# Patient Record
Sex: FEMALE | Race: WHITE | Employment: OTHER | URBAN - NONMETROPOLITAN AREA
[De-identification: names, ages, dates, MRNs, and addresses within clinical notes are randomized per-mention and may not be internally consistent; named-entity substitution may affect disease eponyms.]

---

## 2017-07-31 ENCOUNTER — OFFICE VISIT (OUTPATIENT)
Dept: NEUROLOGY | Age: 67
End: 2017-07-31
Payer: MEDICARE

## 2017-07-31 VITALS
OXYGEN SATURATION: 96 % | BODY MASS INDEX: 50.97 KG/M2 | SYSTOLIC BLOOD PRESSURE: 141 MMHG | DIASTOLIC BLOOD PRESSURE: 56 MMHG | HEIGHT: 62 IN | WEIGHT: 277 LBS | HEART RATE: 91 BPM

## 2017-07-31 DIAGNOSIS — Z99.89 CPAP (CONTINUOUS POSITIVE AIRWAY PRESSURE) DEPENDENCE: ICD-10-CM

## 2017-07-31 DIAGNOSIS — G25.81 RESTLESS LEG SYNDROME: ICD-10-CM

## 2017-07-31 DIAGNOSIS — G47.33 OBSTRUCTIVE SLEEP APNEA: Primary | ICD-10-CM

## 2017-07-31 PROCEDURE — 99213 OFFICE O/P EST LOW 20 MIN: CPT | Performed by: PHYSICIAN ASSISTANT

## 2017-07-31 RX ORDER — FUROSEMIDE 40 MG/1
40 TABLET ORAL 2 TIMES DAILY
Refills: 3 | COMMUNITY
Start: 2017-06-13

## 2017-07-31 RX ORDER — PRAVASTATIN SODIUM 20 MG
TABLET ORAL
Refills: 3 | COMMUNITY
Start: 2017-06-15 | End: 2018-07-31

## 2017-07-31 RX ORDER — CLOTRIMAZOLE AND BETAMETHASONE DIPROPIONATE 10; .64 MG/G; MG/G
CREAM TOPICAL
Refills: 2 | COMMUNITY
Start: 2017-07-02 | End: 2018-10-01

## 2017-12-07 ENCOUNTER — OFFICE VISIT (OUTPATIENT)
Dept: OBSTETRICS AND GYNECOLOGY | Facility: CLINIC | Age: 67
End: 2017-12-07

## 2017-12-07 VITALS
DIASTOLIC BLOOD PRESSURE: 90 MMHG | BODY MASS INDEX: 50.61 KG/M2 | SYSTOLIC BLOOD PRESSURE: 178 MMHG | WEIGHT: 275 LBS | HEIGHT: 62 IN

## 2017-12-07 DIAGNOSIS — N89.8 VAGINAL DISCHARGE: ICD-10-CM

## 2017-12-07 DIAGNOSIS — Z01.419 ENCOUNTER FOR GYNECOLOGICAL EXAMINATION WITHOUT ABNORMAL FINDING: Primary | ICD-10-CM

## 2017-12-07 DIAGNOSIS — R21 RASH: ICD-10-CM

## 2017-12-07 PROCEDURE — 87481 CANDIDA DNA AMP PROBE: CPT | Performed by: NURSE PRACTITIONER

## 2017-12-07 PROCEDURE — 87798 DETECT AGENT NOS DNA AMP: CPT | Performed by: NURSE PRACTITIONER

## 2017-12-07 PROCEDURE — G0101 CA SCREEN;PELVIC/BREAST EXAM: HCPCS | Performed by: NURSE PRACTITIONER

## 2017-12-07 PROCEDURE — 87661 TRICHOMONAS VAGINALIS AMPLIF: CPT | Performed by: NURSE PRACTITIONER

## 2017-12-07 PROCEDURE — 99212 OFFICE O/P EST SF 10 MIN: CPT | Performed by: NURSE PRACTITIONER

## 2017-12-07 PROCEDURE — 87512 GARDNER VAG DNA QUANT: CPT | Performed by: NURSE PRACTITIONER

## 2017-12-07 RX ORDER — VITAMIN E 268 MG
CAPSULE ORAL
COMMUNITY

## 2017-12-07 RX ORDER — PROPRANOLOL HYDROCHLORIDE 20 MG/1
20 TABLET ORAL
Refills: 3 | COMMUNITY
Start: 2017-11-12

## 2017-12-07 RX ORDER — POTASSIUM CHLORIDE 750 MG/1
CAPSULE, EXTENDED RELEASE ORAL
Refills: 3 | COMMUNITY
Start: 2017-09-16

## 2017-12-07 RX ORDER — AMOXICILLIN 250 MG
CAPSULE ORAL
COMMUNITY
End: 2019-12-09

## 2017-12-07 RX ORDER — DULOXETIN HYDROCHLORIDE 60 MG/1
CAPSULE, DELAYED RELEASE ORAL
Refills: 3 | COMMUNITY
Start: 2017-09-19

## 2017-12-07 RX ORDER — CLOTRIMAZOLE AND BETAMETHASONE DIPROPIONATE 10; .64 MG/G; MG/G
CREAM TOPICAL
COMMUNITY
Start: 2017-07-02 | End: 2023-04-06 | Stop reason: ALTCHOICE

## 2017-12-07 RX ORDER — TRANDOLAPRIL AND VERAPAMIL HYDROCHLORIDE 4; 240 MG/1; MG/1
TABLET, FILM COATED, EXTENDED RELEASE ORAL
COMMUNITY

## 2017-12-07 RX ORDER — OMEPRAZOLE 20 MG/1
CAPSULE, DELAYED RELEASE ORAL
Refills: 2 | COMMUNITY
Start: 2017-09-08 | End: 2023-04-06 | Stop reason: ALTCHOICE

## 2017-12-07 RX ORDER — ALLOPURINOL 100 MG/1
TABLET ORAL
Refills: 5 | COMMUNITY
Start: 2017-11-12 | End: 2018-08-02 | Stop reason: DRUGHIGH

## 2017-12-07 RX ORDER — DULOXETIN HYDROCHLORIDE 60 MG/1
CAPSULE, DELAYED RELEASE ORAL
COMMUNITY
Start: 2016-07-02 | End: 2018-07-24 | Stop reason: SDUPTHER

## 2017-12-07 RX ORDER — FUROSEMIDE 40 MG/1
40 TABLET ORAL
Refills: 3 | COMMUNITY
Start: 2017-09-08

## 2017-12-07 RX ORDER — NYSTATIN AND TRIAMCINOLONE ACETONIDE 100000; 1 [USP'U]/G; MG/G
OINTMENT TOPICAL 2 TIMES DAILY
Qty: 30 G | Refills: 2 | Status: SHIPPED | OUTPATIENT
Start: 2017-12-07 | End: 2018-07-24

## 2017-12-07 RX ORDER — CYANOCOBALAMIN 1000 UG/ML
INJECTION, SOLUTION INTRAMUSCULAR; SUBCUTANEOUS
Refills: 2 | COMMUNITY
Start: 2017-11-14 | End: 2023-04-06

## 2017-12-07 RX ORDER — MELOXICAM 7.5 MG/1
TABLET ORAL
COMMUNITY
Start: 2016-05-24 | End: 2018-07-24

## 2017-12-07 RX ORDER — MELOXICAM 7.5 MG/1
TABLET ORAL
Refills: 3 | COMMUNITY
Start: 2017-10-20 | End: 2018-07-24

## 2017-12-07 RX ORDER — LEVOTHYROXINE SODIUM 0.12 MG/1
TABLET ORAL
Refills: 2 | COMMUNITY
Start: 2017-12-01

## 2017-12-07 RX ORDER — EZETIMIBE 10 MG/1
TABLET ORAL
Refills: 3 | COMMUNITY
Start: 2017-10-13

## 2017-12-11 LAB
GEN CATEG CVX/VAG CYTO-IMP: NORMAL
LAB AP CASE REPORT: NORMAL
Lab: NORMAL
PATH INTERP SPEC-IMP: NORMAL
STAT OF ADQ CVX/VAG CYTO-IMP: NORMAL

## 2018-03-22 ENCOUNTER — TRANSCRIBE ORDERS (OUTPATIENT)
Dept: ADMINISTRATIVE | Facility: HOSPITAL | Age: 68
End: 2018-03-22

## 2018-03-22 DIAGNOSIS — D64.9 ANEMIA, UNSPECIFIED TYPE: Primary | ICD-10-CM

## 2018-03-27 ENCOUNTER — APPOINTMENT (OUTPATIENT)
Dept: GENERAL RADIOLOGY | Facility: HOSPITAL | Age: 68
End: 2018-03-27

## 2018-04-04 ENCOUNTER — APPOINTMENT (OUTPATIENT)
Dept: GENERAL RADIOLOGY | Facility: HOSPITAL | Age: 68
End: 2018-04-04

## 2018-07-24 ENCOUNTER — OFFICE VISIT (OUTPATIENT)
Dept: OBSTETRICS AND GYNECOLOGY | Facility: CLINIC | Age: 68
End: 2018-07-24

## 2018-07-24 VITALS
BODY MASS INDEX: 51.16 KG/M2 | SYSTOLIC BLOOD PRESSURE: 170 MMHG | DIASTOLIC BLOOD PRESSURE: 80 MMHG | WEIGHT: 278 LBS | HEIGHT: 62 IN

## 2018-07-24 DIAGNOSIS — R21 RASH: Primary | ICD-10-CM

## 2018-07-24 DIAGNOSIS — Z78.9 NON-SMOKER: ICD-10-CM

## 2018-07-24 PROCEDURE — 99213 OFFICE O/P EST LOW 20 MIN: CPT | Performed by: NURSE PRACTITIONER

## 2018-07-24 RX ORDER — FLUCONAZOLE 100 MG/1
100 TABLET ORAL DAILY
Qty: 5 TABLET | Refills: 0 | Status: SHIPPED | OUTPATIENT
Start: 2018-07-24 | End: 2018-07-29

## 2018-07-24 RX ORDER — HYDRALAZINE HYDROCHLORIDE 25 MG/1
25 TABLET, FILM COATED ORAL 2 TIMES DAILY
Refills: 5 | COMMUNITY
Start: 2018-06-26

## 2018-07-30 LAB
BACTERIA SPEC AEROBE CULT: NORMAL
BACTERIA SPEC ANAEROBE CULT: NORMAL
BACTERIA SPEC CULT: NORMAL
BACTERIA SPEC CULT: NORMAL
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
## 2018-07-31 ENCOUNTER — OFFICE VISIT (OUTPATIENT)
Dept: NEUROLOGY | Age: 68
End: 2018-07-31
Payer: MEDICARE

## 2018-07-31 VITALS
WEIGHT: 277 LBS | HEART RATE: 72 BPM | OXYGEN SATURATION: 96 % | DIASTOLIC BLOOD PRESSURE: 64 MMHG | BODY MASS INDEX: 50.97 KG/M2 | SYSTOLIC BLOOD PRESSURE: 137 MMHG | HEIGHT: 62 IN

## 2018-07-31 DIAGNOSIS — G25.81 RESTLESS LEG SYNDROME: ICD-10-CM

## 2018-07-31 DIAGNOSIS — Z99.89 CPAP (CONTINUOUS POSITIVE AIRWAY PRESSURE) DEPENDENCE: ICD-10-CM

## 2018-07-31 DIAGNOSIS — G47.33 OBSTRUCTIVE SLEEP APNEA: Primary | ICD-10-CM

## 2018-07-31 PROCEDURE — 4040F PNEUMOC VAC/ADMIN/RCVD: CPT | Performed by: PHYSICIAN ASSISTANT

## 2018-07-31 PROCEDURE — 1101F PT FALLS ASSESS-DOCD LE1/YR: CPT | Performed by: PHYSICIAN ASSISTANT

## 2018-07-31 PROCEDURE — 1123F ACP DISCUSS/DSCN MKR DOCD: CPT | Performed by: PHYSICIAN ASSISTANT

## 2018-07-31 PROCEDURE — G8417 CALC BMI ABV UP PARAM F/U: HCPCS | Performed by: PHYSICIAN ASSISTANT

## 2018-07-31 PROCEDURE — 1036F TOBACCO NON-USER: CPT | Performed by: PHYSICIAN ASSISTANT

## 2018-07-31 PROCEDURE — 99213 OFFICE O/P EST LOW 20 MIN: CPT | Performed by: PHYSICIAN ASSISTANT

## 2018-07-31 PROCEDURE — G8400 PT W/DXA NO RESULTS DOC: HCPCS | Performed by: PHYSICIAN ASSISTANT

## 2018-07-31 PROCEDURE — G8427 DOCREV CUR MEDS BY ELIG CLIN: HCPCS | Performed by: PHYSICIAN ASSISTANT

## 2018-07-31 PROCEDURE — 1090F PRES/ABSN URINE INCON ASSESS: CPT | Performed by: PHYSICIAN ASSISTANT

## 2018-07-31 PROCEDURE — 3017F COLORECTAL CA SCREEN DOC REV: CPT | Performed by: PHYSICIAN ASSISTANT

## 2018-07-31 RX ORDER — DOCUSATE SODIUM 100 MG/1
100 CAPSULE, LIQUID FILLED ORAL 4 TIMES DAILY
COMMUNITY

## 2018-07-31 RX ORDER — HYDRALAZINE HYDROCHLORIDE 25 MG/1
25 TABLET, FILM COATED ORAL 2 TIMES DAILY
COMMUNITY
Start: 2018-06-26

## 2018-07-31 RX ORDER — RANITIDINE 150 MG/1
150 TABLET ORAL 2 TIMES DAILY
COMMUNITY
End: 2020-11-19

## 2018-07-31 RX ORDER — ALLOPURINOL 100 MG/1
300 TABLET ORAL DAILY
COMMUNITY
Start: 2017-11-12

## 2018-07-31 NOTE — PROGRESS NOTES
REVIEW OF SYSTEMS    Constitutional: []Fever []Sweats []Chills [] Recent Injury   [x] Denies all unless marked  HENT:[]Headache  [] Head Injury  [] Sore Throat  [] Ear Pain  [] Dizziness [] Hearing Loss   [x] Denies all unless marked  Spine:  [] Neck pain  [] Back pain  [] Sciaticia  [x] Denies all unless marked  Cardiovascular:[]Chest Pain []Palpitations [] Heart Disease  [x] Denies all unless marked  Pulmonary: []Shortness of Breath []Cough   [x] Denies all unless marked  Gastrointestinal:  []Abdominal Pain  []Blood in Stool  []Diarrhea [x]Constipation []Nausea  []Vomiting  [] Denies all unless marked  Genitourinary:  [] Dysuria [] Frequency  [] Incontinence [] Urgency   [x] Denies all unless marked  Musculoskeletal: [x] Arthralgia  [] Myalgias [] Muscle cramps  [] Muscle twitches   [] Denies all unless marked   Extremities:   [] Pain   [] Swelling   [x] Denies all unless marked  Skin:[] Rash  [] Color Change  [x] Denies all unless marked  Neurological:[] Visual Disturbance [] Double Vision [] Slurred Speech [] Trouble swallowing  [] Vertigo [] Tingling [] Numbness [] Weakness [] Loss of Balance   [] Loss of Consciousness [] Memory Loss [] Seizures  [x] Denies all unless marked  Psychiatric/Behavioral:[x] Depression [x] Anxiety  [] Denies all unless marked  Sleep: []  Insomnia [] Sleep Disturbance [] Snoring [] Restless Legs [] Daytime Sleepiness [x] [employer]  [] Denies all unless marked

## 2018-07-31 NOTE — PATIENT INSTRUCTIONS
levels to fall and carbon dioxide to rise. Because the airway is blocked, breathing faster or harder does not help to improve oxygen levels until the airway is reopened. Typically, the obstruction requires the person to awaken to activate the upper airway muscles. Once the airway is opened, the person then takes several deep breaths to catch up on breathing. As the person awakens, he or she may move briefly, snort or snore, and take a deep breath. Less frequently, a person may awaken completely with a sensation of gasping, smothering, or choking. If the person falls back to sleep quickly, he or she will not remember the event. Many people with sleep apnea are unaware of their abnormal breathing in sleep, and all patients underestimate how often their sleep is interrupted. Awakening from sleep causes sleep to be unrefreshing and causes fatigue and daytime sleepiness. Anatomic causes of obstructive sleep apnea   Most patients have LENA because of a small upper airway. As the bones of the face and skull develop, some people develop a small lower face, a small mouth, and a tongue that seems too large for the mouth. These features are genetically determined, which explains why LENA tends to cluster in families. Obesity is another major factor. Tonsil enlargement can be an important cause, especially in children. SLEEP APNEA SYMPTOMS  The main symptoms of LENA are loud snoring, fatigue, and daytime sleepiness. However, some people have no symptoms. For example, if the person does not have a bed partner, he or she may not be aware of the snoring. Fatigue and sleepiness have many causes and are often attributed to overwork and increasing age. As a result, a person may be slow to recognize that they have a problem. A bed partner or spouse often prompts the patient to seek medical care. Other symptoms may include one or more of the following:  ?Restless sleep  ? Awakening with choking, gasping, or smothering  ? Morning headaches, dry mouth, or sore throat  ? Waking frequently to urinate  ? Awakening unrested, groggy  ? Low energy, difficulty concentrating, memory impairment    Risk factors  Certain factors increase the risk of sleep apnea. ?Increasing age [de-identified] LENA occurs at all ages, but it is more common in middle and older age adults. ?Male sex  LENA is two times more common in men, especially in middle age. ?Obesity  The more obese a person is, the more likely he or she is to have LENA. ? Sedation from medication or alcohol  This interferes with the ability to awaken from sleep and can lengthen periods of apnea (no breathing), with potentially dangerous consequences. ? Abnormality of the airway. SLEEP APNEA CONSEQUENCES  Complications of sleep apnea can include daytime sleepiness and difficulty concentrating. The consequence of this is an increased risk of accidents and errors in daily activities. Studies have shown that people with severe LENA are more than twice as likely to be involved in a motor vehicle accident as people without these conditions. People with LENA are encouraged to discuss options for driving, working, and performing other high-risk tasks with a healthcare provider. In addition, people with untreated LENA may have an increased risk of cardiovascular problems such as high blood pressure, heart attack, abnormal heart rhythms, or stroke. This risk may be due to changes in the heart rate and blood pressure that occur during sleep. SLEEP APNEA DIAGNOSIS  The diagnosis of LENA is best made by a knowledgeable sleep medicine specialist who has an understanding of the individual's health issues. The diagnosis is usually based upon the person's medical history, physical examination, and testing, including:  ? A complaint of snoring and ineffective sleep  ? Neck size (greater than 16 inches in men or 14 inches in women) is associated with an increased risk of sleep apnea  ? A small upper airway: difficulty seeing the have a higher success rate, particularly in people with abnormal jaw (maxilla and mandible) anatomy, but it is the most complicated procedure. A newer surgical approach, nerve stimulation to protrude the tongue, has promising success rates in very selected people. Tracheostomy creates a permanent opening in the neck. It is reserved for people with severe disease in whom less drastic measures have failed or are inappropriate. Although it is always successful in eliminating obstructive sleep apnea, tracheostomy requires significant lifestyle changes and carries some serious risks (eg, infection, bleeding, blockage). All surgical treatments require discussions about the goals of treatment, the expected outcomes, and potential complications. Hypoglossal nerve stimulator- \"Inspire\" device    WHERE TO GET MORE INFORMATION  Your healthcare provider is the best source of information for questions and concerns related to your medical problem. Organizations  American [employer] Association  Provides information about sleep apnea to the public, publishes a newsletter, and serves as an advocate for people with the disorder. Renetta, [address]   Don@[employer]. org   [email or web address]. org   Tel: [phone]   Fax: [facility] organization that works to [employer] and safety by promoting public understanding of sleep and sleep disorders. Supports sleep-related education, research, and advocacy; produces and distributes educational materials to the public and healthcare professionals; and offers postdoctoral fellowships and grants for sleep researchers. Katelyn [email or web address]   Beth.[email or web address] Rudy Reis   Tel: [phone]   Fax: [phone]    Important information:  Medicare/private insurance CPAP/BiPAP/APAP requirements:  Medicare/private insurance has specific requirements for PAP compliance that must be met during the first 90 days of use to continue coverage for CPAP/BiPAP/APAP  from day 91 and beyond. The policy requires that patients use a PAP device 4 hours per 24 hour period, at least 70% of the time over a 30 day period. This data must be downloaded as a report direct from the PAP devices. This is called a compliance download. Your PAP supplier will assist you in this matter. Reminder:  Please bring a copy of the compliance download to your next office visit or have your supplier fax it to our office prior to your office visit. Note:  Where applicable, we will utilize PAP device efficiency reports, additional testing, and face-to-face  clinical evaluation subsequent to any treatment, changes in treatment, and continued treatment. Caution:  Please abstain from driving or engaging in other activities which may be hazardous in the presence of diminished alertness or daytime drowsiness. And avoid the use of sedatives or alcohol, which can worsen sleep apnea and daytime drowsiness. Mask suggestions:  - Resmed Airfit N20 (Nasal) or F20 (Full face mask). They conform to your face, thus decreasing the potential for mask leakage. You might like the [employer] (full face mask). It has a \"memory foam\" like cushion. You might also like to try a nasal mask called a Dreamwear nasal mask or the Dreamwear nasal pillow. One other suggestion is the [facility], it is a minimal contact full face mask. The Merrily Sabas incredible under the nose design makes it the only full face mask that won't cause red marks on the bridge of your nose when compared to other full face masks. The newest mask available is a Dreamwear full face mask. It has a soft feel, unique in-frame air-flow, and innovative air tube connection at the top of the head for the ultimate in sleep comfort.         Patient Education        Restless Legs Syndrome: Care Instructions  Your increase your chances of quitting for good. · Do not drink alcohol late in the evening. Take steps to help you sleep better  · Get plenty of sunlight in the outdoors, particularly later in the afternoon. · Use the evening hours for settling down. Avoid activities that challenge you in the hours before bedtime. · Eat meals at regular times, and do not snack before bedtime. · Keep your bedroom quiet, dark, and cool. Try using a sleep mask and earplugs to help you sleep. · Limit how much you drink at night to reduce your need to get up to urinate. But do not go to bed thirsty. · Run a fan or other steady \"white noise\" during the night if noises wake you up. · [place] the bed for sleeping and sex. Do your reading or TV watching in another room. · Once you are in bed, relax from head to toe, and guide your mind to pleasant thoughts. · Do not stay in bed longer than 8 hours, and try to avoid naps. · If your symptoms usually improve around 4 a.m. to 6 a.m., try going to bed later than usual or allowing extra time for sleeping in to help you get the rest you need. When should you call for help? Watch closely for changes in your health, and be sure to contact your doctor if:    · You are still not getting enough sleep.     · Your symptoms become more severe or happen more often. Where can you learn more? Go to [email or web address] org and sign in to your [employer] account. Enter B191 in the [facility] box to learn more about \"Restless Legs Syndrome: Care Instructions. \"     If you do not have an account, please click on the \"Sign Up Now\" link. Current as of: October 9, 2017  Content Version: 11.6  © 2313-7835 [employer]. Care instructions adapted under license by [employer].  If you have questions about a medical condition or this instruction, always ask your healthcare professional. [address]  any warranty or liability for your use of this information.

## 2018-08-02 ENCOUNTER — OFFICE VISIT (OUTPATIENT)
Dept: OBSTETRICS AND GYNECOLOGY | Facility: CLINIC | Age: 68
End: 2018-08-02

## 2018-08-02 VITALS
WEIGHT: 275 LBS | SYSTOLIC BLOOD PRESSURE: 160 MMHG | BODY MASS INDEX: 50.61 KG/M2 | DIASTOLIC BLOOD PRESSURE: 80 MMHG | HEIGHT: 62 IN

## 2018-08-02 DIAGNOSIS — Z78.9 NON-SMOKER: ICD-10-CM

## 2018-08-02 DIAGNOSIS — R21 RASH: Primary | ICD-10-CM

## 2018-08-02 PROCEDURE — 99213 OFFICE O/P EST LOW 20 MIN: CPT | Performed by: NURSE PRACTITIONER

## 2018-08-02 RX ORDER — VALACYCLOVIR HYDROCHLORIDE 1 G/1
1000 TABLET, FILM COATED ORAL 3 TIMES DAILY
Qty: 21 TABLET | Refills: 0 | Status: SHIPPED | OUTPATIENT
Start: 2018-08-02 | End: 2019-12-09

## 2018-08-02 RX ORDER — RANITIDINE 150 MG/1
150 TABLET ORAL
COMMUNITY
End: 2023-04-06 | Stop reason: ALTCHOICE

## 2018-08-02 RX ORDER — ALLOPURINOL 300 MG/1
300 TABLET ORAL DAILY
Refills: 5 | COMMUNITY
Start: 2018-07-28

## 2018-08-02 RX ORDER — FLUCONAZOLE 100 MG/1
100 TABLET ORAL DAILY
Qty: 10 TABLET | Refills: 0 | Status: SHIPPED | OUTPATIENT
Start: 2018-08-02 | End: 2019-12-09

## 2018-08-02 RX ORDER — METHYLPREDNISOLONE 4 MG/1
TABLET ORAL
COMMUNITY
Start: 2018-07-31 | End: 2019-12-09

## 2018-10-01 ENCOUNTER — OFFICE VISIT (OUTPATIENT)
Dept: NEUROLOGY | Age: 68
End: 2018-10-01
Payer: MEDICARE

## 2018-10-01 VITALS
SYSTOLIC BLOOD PRESSURE: 117 MMHG | DIASTOLIC BLOOD PRESSURE: 45 MMHG | BODY MASS INDEX: 51.53 KG/M2 | HEIGHT: 62 IN | WEIGHT: 280 LBS | OXYGEN SATURATION: 95 % | HEART RATE: 64 BPM

## 2018-10-01 DIAGNOSIS — Z99.89 CPAP (CONTINUOUS POSITIVE AIRWAY PRESSURE) DEPENDENCE: ICD-10-CM

## 2018-10-01 DIAGNOSIS — G47.33 OBSTRUCTIVE SLEEP APNEA: Primary | ICD-10-CM

## 2018-10-01 DIAGNOSIS — G25.81 RESTLESS LEG SYNDROME: ICD-10-CM

## 2018-10-01 PROCEDURE — 1123F ACP DISCUSS/DSCN MKR DOCD: CPT | Performed by: PHYSICIAN ASSISTANT

## 2018-10-01 PROCEDURE — G8427 DOCREV CUR MEDS BY ELIG CLIN: HCPCS | Performed by: PHYSICIAN ASSISTANT

## 2018-10-01 PROCEDURE — 1101F PT FALLS ASSESS-DOCD LE1/YR: CPT | Performed by: PHYSICIAN ASSISTANT

## 2018-10-01 PROCEDURE — 1090F PRES/ABSN URINE INCON ASSESS: CPT | Performed by: PHYSICIAN ASSISTANT

## 2018-10-01 PROCEDURE — 99213 OFFICE O/P EST LOW 20 MIN: CPT | Performed by: PHYSICIAN ASSISTANT

## 2018-10-01 PROCEDURE — G8484 FLU IMMUNIZE NO ADMIN: HCPCS | Performed by: PHYSICIAN ASSISTANT

## 2018-10-01 PROCEDURE — G8400 PT W/DXA NO RESULTS DOC: HCPCS | Performed by: PHYSICIAN ASSISTANT

## 2018-10-01 PROCEDURE — 3017F COLORECTAL CA SCREEN DOC REV: CPT | Performed by: PHYSICIAN ASSISTANT

## 2018-10-01 PROCEDURE — 1036F TOBACCO NON-USER: CPT | Performed by: PHYSICIAN ASSISTANT

## 2018-10-01 PROCEDURE — 4040F PNEUMOC VAC/ADMIN/RCVD: CPT | Performed by: PHYSICIAN ASSISTANT

## 2018-10-01 PROCEDURE — G8417 CALC BMI ABV UP PARAM F/U: HCPCS | Performed by: PHYSICIAN ASSISTANT

## 2018-10-01 NOTE — PROGRESS NOTES
marked  Cardiovascular:[]Chest Pain []Palpitations [] Heart Disease  [x] Denies all unless marked  Pulmonary: []Shortness of Breath []Cough   [x] Denies all unless marked  Gastrointestinal:  []Abdominal Pain  []Blood in Stool  []Diarrhea []Constipation []Nausea  []Vomiting  [x] Denies all unless marked  Genitourinary:  [] Dysuria [] Frequency  [] Incontinence [] Urgency   [x] Denies all unless marked  Musculoskeletal: [] Arthralgia  [] Myalgias [] Muscle cramps  [] Muscle twitches   [x] Denies all unless marked   Extremities:   [] Pain   [] Swelling   [x] Denies all unless marked  Skin:[] Rash  [] Color Change  [x] Denies all unless marked  Neurological:[] Visual Disturbance [] Double Vision [] Slurred Speech [] Trouble swallowing  [] Vertigo [] Tingling [] Numbness [] Weakness [] Loss of Balance   [] Loss of Consciousness [] Memory Loss [] Seizures  [x] Denies all unless marked  Psychiatric/Behavioral:[] Depression [] Anxiety  [x] Denies all unless marked  Sleep: []  Insomnia [] Sleep Disturbance [] Snoring [] Restless Legs [] Daytime Sleepiness [x] [employer]  [x] Denies all unless marked    The MA has completed the ROS with the patient. I have reviewed it in its' entirety with the patient and agree with the documentation. PHYSICAL EXAM  BP (!) 117/45   Pulse 64   Ht 5' 2\" (1.575 m)   Wt 280 lb (127 kg)   SpO2 95%   BMI 51.21 kg/m²      Constitutional  No acute distress    HEENT- Conjunctiva normal.  No scars, masses, or lesions over external nose or ears, no neck masses noted, no jugular vein distension, no bruit  Cardiac- Regular rate and rhythm; Grade I/VI murmur  Pulmonary- Clear to auscultation, good expansion, normal effort without use of accessory muscles  Musculoskeletal  No significant wasting of muscles noted, no bony deformities  Extremities - No clubbing, cyanosis or edema  Skin  Warm, dry, and intact.   No rash, erythema, or pallor  Psychiatric  Mood, affect, and behavior appear normal suppressants. 2.  The following educational material has been included in this visit after visit summary for your review: LENA/PAP guidelines/RLS-Discussed with the patient and all questions fully answered. 3.  The current medical regimen is effective;  continue present plan. 4.  Continue CPAP  5. Monitor B/P and follow up with PMD if it persists to be hypotensive  6. Follow up in 1 year      Note:  A total of >50% (>8 minutes) of 15 minutes was spent discussing the pathophysiology and treatment and/or coordination of care of the above diagnoses.

## 2018-10-01 NOTE — PROGRESS NOTES

## 2018-10-01 NOTE — PATIENT INSTRUCTIONS
Patient education: Sleep apnea in adults       INTRODUCTION  Normally during sleep, air moves through the throat and in and out of the lungs at a regular rhythm. In a person with sleep apnea, air movement is periodically diminished or stopped. There are two types of sleep apnea: obstructive sleep apnea and central sleep apnea. In obstructive sleep apnea, breathing is abnormal because of narrowing or closure of the throat. In central sleep apnea, breathing is abnormal because of a change in the breathing control and rhythm. Sleep apnea is a serious condition that can affect a person's ability to safely perform normal daily activities and can affect long term health. Approximately 25 percent of adults are at risk for sleep apnea of some degree. Men are more commonly affected than women. Other risk factors include middle and older age, being overweight or obese, and having a small mouth and throat. This topic review focuses on the most common type of sleep apnea in adults, obstructive sleep apnea (LENA). HOW SLEEP APNEA OCCURS  The throat is surrounded by muscles that control the airway for speaking, swallowing, and breathing. During sleep, these muscles are less active, and this causes the throat to narrow. In most people, this narrowing does not affect breathing. In others, it can cause snoring, sometimes with reduced or completely blocked airflow. A completely blocked airway without airflow is called an obstructive apnea. Partial obstruction with diminished airflow is called a hypopnea. A person may have apnea and hypopnea during sleep. Insufficient breathing due to apnea or hypopnea causes oxygen levels to fall and carbon dioxide to rise. Because the airway is blocked, breathing faster or harder does not help to improve oxygen levels until the airway is reopened. Typically, the obstruction requires the person to awaken to activate the upper airway muscles.  Once the airway is opened, the person then takes special management and close monitoring to reduce the risk of a blocked airway. Dental devices  A dental device, called an oral appliance or mandibular advancement device, can reposition the jaw (mandible), bringing the tongue and soft palate forward as well. This may relieve obstruction in some people. This treatment is excellent for reducing snoring, although the effect on LENA is sometimes more limited. As a result, dental devices are best used for mild cases of LENA when relief of snoring is the main goal. Failure to tolerate and accept CPAP is another indication for dental devices. While dental devices are not as effective as CPAP for LENA, some patients prefer a dental device to CPAP. Side effects of dental devices are generally minor but may include changes to the bite with prolonged use. Surgical treatment  Surgery is an alternative therapy for patients who cannot tolerate or do not improve with nonsurgical treatments such as CPAP or oral devices. Surgery can also be used in combination with other nonsurgical treatments. Surgical procedures reshape structures in the upper airways or surgically reposition bone or soft tissue. Uvulopalatopharyngoplasty (UPPP) removes the uvula and excessive tissue in the throat, including the tonsils, if present. Other procedures, such as maxillomandibular advancement (MMA), address both the upper and lower pharyngeal airway more globally. UPPP alone has limited success rates (less than 50 percent) and people can relapse (when LENA symptoms return after surgery). As a result, this surgery is only recommended in a minority of people and should be considered with caution. MMA may have a higher success rate, particularly in people with abnormal jaw (maxilla and mandible) anatomy, but it is the most complicated procedure. A newer surgical approach, nerve stimulation to protrude the tongue, has promising success rates in very selected people.   Tracheostomy creates a permanent opening in the neck. It is reserved for people with severe disease in whom less drastic measures have failed or are inappropriate. Although it is always successful in eliminating obstructive sleep apnea, tracheostomy requires significant lifestyle changes and carries some serious risks (eg, infection, bleeding, blockage). All surgical treatments require discussions about the goals of treatment, the expected outcomes, and potential complications. Hypoglossal nerve stimulator- \"Inspire\" device    WHERE TO GET MORE INFORMATION  Your healthcare provider is the best source of information for questions and concerns related to your medical problem. Organizations  American [employer] Association  Provides information about sleep apnea to the public, publishes a newsletter, and serves as an advocate for people with the disorder. [place], [address] [place] [address]   Umberto@[email or web address]. org   [email or web address]   Tel: [phone]   Fax: [facility] organization that works to [employer] and safety by promoting public understanding of sleep and sleep disorders. Supports sleep-related education, research, and advocacy; produces and distributes educational materials to the public and healthcare professionals; and offers postdoctoral fellowships and grants for sleep researchers. Katelyn [address]   [email or web address]@[employer]. org   [email or web address] org   Tel: [phone]   Fax: [phone]    Important information:  Medicare/private insurance CPAP/BiPAP/APAP requirements:  Medicare/private insurance has specific requirements for PAP compliance that must be met during the first 90 days of use to continue coverage for CPAP/BiPAP/APAP  from day 91 and beyond. The policy requires that patients use a PAP device 4 hours per 24 hour period, at least 70% of the time over a 30 day period.  This data must before bedtime. · Eat meals at regular times, and do not snack before bedtime. · Keep your bedroom quiet, dark, and cool. Try using a sleep mask and earplugs to help you sleep. · Limit how much you drink at night to reduce your need to get up to urinate. But do not go to bed thirsty. · Run a fan or other steady \"white noise\" during the night if noises wake you up. · Clay the bed for sleeping and sex. Do your reading or TV watching in another room. · Once you are in bed, relax from head to toe, and guide your mind to pleasant thoughts. · Do not stay in bed longer than 8 hours, and try to avoid naps. · If your symptoms usually improve around 4 a.m. to 6 a.m., try going to bed later than usual or allowing extra time for sleeping in to help you get the rest you need. When should you call for help? Watch closely for changes in your health, and be sure to contact your doctor if:    · You are still not getting enough sleep.     · Your symptoms become more severe or happen more often. Where can you learn more? Go to https://[email or web address].Silver [email or web address] org and sign in to your [employer] account. Enter Y124 in the Ask The Doctor box to learn more about \"Restless Legs Syndrome: Care Instructions. \"     If you do not have an account, please click on the \"Sign Up Now\" link. Current as of: October 9, 2017  Content Version: 11.7  © 7879-4590 [employer]. Care instructions adapted under license by [facility] [place] ([facility]). If you have questions about a medical condition or this instruction, always ask your healthcare professional. [place] [address] any warranty or liability for your use of this information.

## 2019-10-01 ENCOUNTER — OFFICE VISIT (OUTPATIENT)
Dept: NEUROLOGY | Age: 69
End: 2019-10-01
Payer: MEDICARE

## 2019-10-01 VITALS
BODY MASS INDEX: 49.32 KG/M2 | WEIGHT: 268 LBS | DIASTOLIC BLOOD PRESSURE: 67 MMHG | HEART RATE: 77 BPM | HEIGHT: 62 IN | SYSTOLIC BLOOD PRESSURE: 135 MMHG | OXYGEN SATURATION: 97 %

## 2019-10-01 DIAGNOSIS — G47.33 OBSTRUCTIVE SLEEP APNEA: Primary | ICD-10-CM

## 2019-10-01 DIAGNOSIS — G25.81 RESTLESS LEG SYNDROME: ICD-10-CM

## 2019-10-01 DIAGNOSIS — Z99.89 CPAP (CONTINUOUS POSITIVE AIRWAY PRESSURE) DEPENDENCE: ICD-10-CM

## 2019-10-01 PROCEDURE — G8484 FLU IMMUNIZE NO ADMIN: HCPCS | Performed by: PHYSICIAN ASSISTANT

## 2019-10-01 PROCEDURE — G8400 PT W/DXA NO RESULTS DOC: HCPCS | Performed by: PHYSICIAN ASSISTANT

## 2019-10-01 PROCEDURE — 1036F TOBACCO NON-USER: CPT | Performed by: PHYSICIAN ASSISTANT

## 2019-10-01 PROCEDURE — G8417 CALC BMI ABV UP PARAM F/U: HCPCS | Performed by: PHYSICIAN ASSISTANT

## 2019-10-01 PROCEDURE — 3017F COLORECTAL CA SCREEN DOC REV: CPT | Performed by: PHYSICIAN ASSISTANT

## 2019-10-01 PROCEDURE — 4040F PNEUMOC VAC/ADMIN/RCVD: CPT | Performed by: PHYSICIAN ASSISTANT

## 2019-10-01 PROCEDURE — 1090F PRES/ABSN URINE INCON ASSESS: CPT | Performed by: PHYSICIAN ASSISTANT

## 2019-10-01 PROCEDURE — 1123F ACP DISCUSS/DSCN MKR DOCD: CPT | Performed by: PHYSICIAN ASSISTANT

## 2019-10-01 PROCEDURE — G8427 DOCREV CUR MEDS BY ELIG CLIN: HCPCS | Performed by: PHYSICIAN ASSISTANT

## 2019-10-01 PROCEDURE — 99213 OFFICE O/P EST LOW 20 MIN: CPT | Performed by: PHYSICIAN ASSISTANT

## 2019-10-01 RX ORDER — TOBRAMYCIN 3 MG/ML
SOLUTION/ DROPS OPHTHALMIC
Refills: 0 | COMMUNITY
Start: 2019-08-30 | End: 2020-11-19

## 2019-10-01 RX ORDER — CLOTRIMAZOLE AND BETAMETHASONE DIPROPIONATE 10; .64 MG/G; MG/G
CREAM TOPICAL
COMMUNITY
Start: 2017-07-02 | End: 2020-11-19

## 2019-10-01 RX ORDER — KETOCONAZOLE 20 MG/G
CREAM TOPICAL 2 TIMES DAILY
Refills: 3 | COMMUNITY
Start: 2019-08-12

## 2019-10-01 RX ORDER — PREDNISOLONE ACETATE 10 MG/ML
SUSPENSION/ DROPS OPHTHALMIC
Refills: 0 | COMMUNITY
Start: 2019-08-30 | End: 2020-11-19

## 2019-10-01 RX ORDER — TRIAMCINOLONE ACETONIDE 1 MG/G
CREAM TOPICAL 2 TIMES DAILY
Refills: 3 | COMMUNITY
Start: 2019-08-12

## 2019-12-09 ENCOUNTER — OFFICE VISIT (OUTPATIENT)
Dept: OBSTETRICS AND GYNECOLOGY | Facility: CLINIC | Age: 69
End: 2019-12-09

## 2019-12-09 VITALS
BODY MASS INDEX: 48.95 KG/M2 | SYSTOLIC BLOOD PRESSURE: 140 MMHG | WEIGHT: 266 LBS | DIASTOLIC BLOOD PRESSURE: 80 MMHG | HEIGHT: 62 IN

## 2019-12-09 DIAGNOSIS — Z78.0 POST-MENOPAUSAL: ICD-10-CM

## 2019-12-09 DIAGNOSIS — Z78.9 NON-SMOKER: ICD-10-CM

## 2019-12-09 DIAGNOSIS — Z01.419 ENCOUNTER FOR GYNECOLOGICAL EXAMINATION WITHOUT ABNORMAL FINDING: Primary | ICD-10-CM

## 2019-12-09 DIAGNOSIS — Z13.820 ENCOUNTER FOR SCREENING FOR OSTEOPOROSIS: ICD-10-CM

## 2019-12-09 DIAGNOSIS — Z12.31 ENCOUNTER FOR SCREENING MAMMOGRAM FOR MALIGNANT NEOPLASM OF BREAST: ICD-10-CM

## 2019-12-09 PROCEDURE — G0101 CA SCREEN;PELVIC/BREAST EXAM: HCPCS | Performed by: NURSE PRACTITIONER

## 2020-06-15 DIAGNOSIS — Z12.31 ENCOUNTER FOR SCREENING MAMMOGRAM FOR MALIGNANT NEOPLASM OF BREAST: ICD-10-CM

## 2020-06-15 DIAGNOSIS — Z78.0 POST-MENOPAUSAL: ICD-10-CM

## 2020-06-15 DIAGNOSIS — Z13.820 ENCOUNTER FOR SCREENING FOR OSTEOPOROSIS: ICD-10-CM

## 2020-10-02 ENCOUNTER — OFFICE VISIT (OUTPATIENT)
Dept: NEUROLOGY | Age: 70
End: 2020-10-02
Payer: MEDICARE

## 2020-10-02 VITALS
RESPIRATION RATE: 18 BRPM | DIASTOLIC BLOOD PRESSURE: 60 MMHG | WEIGHT: 268 LBS | BODY MASS INDEX: 49.32 KG/M2 | SYSTOLIC BLOOD PRESSURE: 121 MMHG | HEIGHT: 62 IN | HEART RATE: 78 BPM

## 2020-10-02 PROCEDURE — 1090F PRES/ABSN URINE INCON ASSESS: CPT | Performed by: PHYSICIAN ASSISTANT

## 2020-10-02 PROCEDURE — G8400 PT W/DXA NO RESULTS DOC: HCPCS | Performed by: PHYSICIAN ASSISTANT

## 2020-10-02 PROCEDURE — G8484 FLU IMMUNIZE NO ADMIN: HCPCS | Performed by: PHYSICIAN ASSISTANT

## 2020-10-02 PROCEDURE — 1036F TOBACCO NON-USER: CPT | Performed by: PHYSICIAN ASSISTANT

## 2020-10-02 PROCEDURE — G8427 DOCREV CUR MEDS BY ELIG CLIN: HCPCS | Performed by: PHYSICIAN ASSISTANT

## 2020-10-02 PROCEDURE — 4040F PNEUMOC VAC/ADMIN/RCVD: CPT | Performed by: PHYSICIAN ASSISTANT

## 2020-10-02 PROCEDURE — 99213 OFFICE O/P EST LOW 20 MIN: CPT | Performed by: PHYSICIAN ASSISTANT

## 2020-10-02 PROCEDURE — 1123F ACP DISCUSS/DSCN MKR DOCD: CPT | Performed by: PHYSICIAN ASSISTANT

## 2020-10-02 PROCEDURE — 3017F COLORECTAL CA SCREEN DOC REV: CPT | Performed by: PHYSICIAN ASSISTANT

## 2020-10-02 PROCEDURE — G8417 CALC BMI ABV UP PARAM F/U: HCPCS | Performed by: PHYSICIAN ASSISTANT

## 2020-10-02 NOTE — PROGRESS NOTES
[facility] Sleep Follow Up Encounter      Information:   Patient Name: Yehuda Romo  :   1950  Age:   78 y.o. MRN:   [id number]  Account #:  [de-identified]  Today:                10/2/20    Provider:  Bryan Herrera PA-C    Chief Complaint   Patient presents with    Follow-up    [employer]        Subjective:   Yehuda Romo is a 78 y.o. female  with a history of severe LENA and RLS who comes in for a sleep clinic follow up. The PSG, 2010 revealed an AHI of 20.5; in REM AHI of 56.5. She is prescribed auto CPAP with a pressure range of 8cm to 16cm. The compliance report indicates that she is averaging 5-6 hours of CPAP use per day. She reports that consistent CPAP use has alleviated the previous LENA symptoms. The RLS is stable. She has chronic knee pain. Location or symptom:  LENA  Onset:  PS  Timing:  q hs  Severity:  Severe  Associated:  Snoring, witnessed apneas, and excessive daytime somnolence  Alleviated:  CPAP       Objective:     Past Medical History:   Diagnosis Date    CPAP (continuous positive airway pressure) dependence     8cm to 16cm    Heart murmur     Obstructive sleep apnea     AHI:  20.5; In REM AHI of 56.5 per PSG, 2010    Restless leg syndrome        Past Surgical History:   Procedure Laterality Date    BREAST BIOPSY Left     CARDIAC CATHETERIZATION      CATARACT REMOVAL Right     COLONOSCOPY   and     FOOT SURGERY Right     mortons     HYSTERECTOMY      KNEE SURGERY Left     replacement    RECTAL SURGERY      abcess gland    STOMACH SURGERY      tumor removed       Recent Hospitalizations  ·     Significant Injuries  ·     History reviewed. No pertinent family history.     Social History  Social History     Tobacco Use   Smoking Status Never Smoker   Smokeless Tobacco Never Used     Social History     Substance and Sexual Activity   Alcohol Use No     Social History     Substance and Sexual Activity   Drug Use No         Current Outpatient Medications Medication Sig Dispense Refill    ketoconazole (NIZORAL) 2 % cream APPLY TO AFFECTED AREA TWICE A DAY  3    clotrimazole-betamethasone (LOTRISONE) 1-0.05 % cream 1 (ONE) APPLICATION TO AFFECTED AREA TWO TIMES DAILY      triamcinolone (KENALOG) 0.1 % cream APPLY TO AFFECTED AREA TWICE A DAY  3    hydrALAZINE (APRESOLINE) 25 MG tablet Take 25 mg by mouth      allopurinol (ZYLOPRIM) 100 MG tablet TAKE 1 TABLET BY MOUTH DAILY 300mg      docusate sodium (COLACE) 100 MG capsule Take 100 mg by mouth 4 times daily      furosemide (LASIX) 40 MG tablet TAKE 1 TABLET BY MOUTH TWICE A DAY  3    cyanocobalamin 1000 MCG/ML injection ML INTRAMUSCULAR INJECT INJECT 1 ML INTRAMUSCULARLY EVERY 2 WEEKS  2    DULoxetine (CYMBALTA) 60 MG capsule 1 CAPSULE BY MOUTH DAILY  3    ZETIA 10 MG tablet TAKE 1 TABLET BY MOUTH EVERY DAY  3    FREESTYLE LITE strip 1 ITEM DAILY TEST BLOOD SUGAR ONCE DAILY. DX:E11.9  3    levothyroxine (SYNTHROID) 125 MCG tablet TAKE 1 TABLET DAILY FOR THYROID.  2    metFORMIN (GLUCOPHAGE) 1000 MG tablet 500 mg 1000mg BID  11    potassium chloride (MICRO-K) 10 MEQ CR capsule TAKE 1 CAPSULE BY MOUTH EVERY DAY  3    propranolol (INDERAL) 10 MG tablet TAKE 1 TABLET BY MOUTH TWICE A DAY  0    vitamin E 400 UNIT capsule Take 400 Units by mouth daily      trandolapril-verapamil (TARKA) 4-240 MG per tablet Take 1 tablet by mouth daily      SENNA CO by Combination route      CPAP Machine MISC by Does not apply route      prednisoLONE acetate (PRED FORTE) 1 % ophthalmic suspension ISTILL 1 DROP IN EACH EYE 4 TIMES DAILY FOR 7 DAYS  0    tobramycin (TOBREX) 0.3 % ophthalmic solution INSTILL 1 DROP IN EACH EYE 4 TIMES DAILY FOR 7 DAYS  0    ranitidine (ZANTAC) 150 MG tablet Take 150 mg by mouth 2 times daily       No current facility-administered medications for this visit. Allergies:  Latex; Ibuprofen; Asa [aspirin]; Bee venom; Betadine [povidone iodine];  Codeine; Donnatal [phenobarbital-belladonna alk]; Fulvicin-p-g [griseofulvin]; Tape [adhesive tape]; Lortab [hydrocodone-acetaminophen]; and Wasp venom    REVIEW OF SYSTEMS     Constitutional: []? Fever []? Sweats []? Chills []? Recent Injury   [x]? Denies all unless marked  HENT:[]? Headache  []? Head Injury  []? Sore Throat  []? Ear Pain  []? Dizziness []? Hearing Loss   [x]? Denies all unless marked  Spine:  []? Neck pain  []? Back pain  []? Sciaticia  [x]? Denies all unless marked  Cardiovascular:[]? Chest Pain []? Palpitations []? Heart Disease  [x]? Denies all unless marked  Pulmonary: []? Shortness of Breath []? Cough   [x]? Denies all unless marked  Gastrointestinal:  []? Abdominal Pain  []? Blood in Stool  []? Diarrhea []? Constipation []? Nausea  []? Vomiting  [x]? Denies all unless marked  Genitourinary:  []? Dysuria []? Frequency  []? Incontinence []? Urgency   [x]? Denies all unless marked  Musculoskeletal: []? Arthralgia  []? Myalgias []? Muscle cramps  []? Muscle twitches   [x]? Denies all unless marked   Extremities:   []? Pain   []? Swelling   [x]? Denies all unless marked  Skin:[]? Rash  []? Color Change  [x]? Denies all unless marked  Neurological:[]? Visual Disturbance []? Double Vision []? Slurred Speech []? Trouble swallowing  []? Vertigo []? Tingling []? Numbness []? Weakness []? Loss of Balance   []? Loss of Consciousness []? Memory Loss []? Seizures  [x]? Denies all unless marked  Psychiatric/Behavioral:[]? Depression []? Anxiety  [x]? Denies all unless marked  Sleep: []? Insomnia []? Sleep Disturbance []? Snoring []? Restless Legs []? Daytime Sleepiness [x]? [employer]  []? Denies all unless marked    The MA has completed the ROS with the patient. I have reviewed it in its' entirety with the patient and agree with the documentation.        PHYSICAL EXAM  /60   Pulse 78   Resp 18   Ht 5' 2\" (1.575 m)   Wt 268 lb (121.6 kg)   BMI 49.02 kg/m²      Constitutional - No acute distress    HEENT- Conjunctiva normal. No scars, masses, or lesions over external nose or ears, no neck masses noted, no jugular vein distension, questionable left carotid bruit  Cardiac- Regular rate and rhythm; Grade I/VI murmur (previously diagnosed)  Pulmonary- Clear to auscultation, good expansion, normal effort without use of accessory muscles  Musculoskeletal - No significant wasting of muscles noted, no bony deformities  Extremities - No clubbing, cyanosis or edema  Skin - Warm, dry, and intact. No rash, erythema, or pallor  Psychiatric - Mood, affect, and behavior appear normal      Neurologic:  Extraocular movements are intact without nystagmus. Visual fields are full to confrontation. Facial movements are symmetrical and normal.  Speech is precise. Extremity strength is normal in both uppers and lowers. Deep tendon reflexes are intact and symmetrical.  Rapid alternating movements are unimpaired. Finger-to-nose testing is performed well, without dysmetria. Gait is antalgic with a quad cane. I reviewed the following studies:      []  :  Clinical laboratory test results    []  :  Radiology reports    [x]  :  Review and summarization of medical records and/or obtain medical records     []  :  Previous/recent polysomnogram report(s)    []  :  [place] Sleepiness Scale      [x]  :  Compliance download: The auto CPAP is set at a pressure rangeo fo 8cm to 16cm. Compliance download shows that she uses device: 97% of the time;  percentage of days with usage >=4 hours: 80%. AHI: 1.1    Assessment:       ICD-10-CM    1. Obstructive sleep apnea  G47.33    2. Restless leg syndrome  G25.81    3. Bruit of left carotid artery  R09.89    4.  CPAP (continuous positive airway pressure) dependence  Z99.89           []  :  Stable     []  :  Improved                       [x]  :  Well controlled              []  :  Resolving     []  :  Resolved     []  :  Inadequately controlled     []  :  Worsening     [x]  :  Additional workup planned-advised

## 2020-10-02 NOTE — PATIENT INSTRUCTIONS
Patient education: Sleep apnea in adults       INTRODUCTION -- Normally during sleep, air moves through the throat and in and out of the lungs at a regular rhythm. In a person with sleep apnea, air movement is periodically diminished or stopped. There are two types of sleep apnea: obstructive sleep apnea and central sleep apnea. In obstructive sleep apnea, breathing is abnormal because of narrowing or closure of the throat. In central sleep apnea, breathing is abnormal because of a change in the breathing control and rhythm. Sleep apnea is a serious condition that can affect a person's ability to safely perform normal daily activities and can affect long term health. Approximately 25 percent of adults are at risk for sleep apnea of some degree. Men are more commonly affected than women. Other risk factors include middle and older age, being overweight or obese, and having a small mouth and throat. This topic review focuses on the most common type of sleep apnea in adults, obstructive sleep apnea (LENA). HOW SLEEP APNEA OCCURS -- The throat is surrounded by muscles that control the airway for speaking, swallowing, and breathing. During sleep, these muscles are less active, and this causes the throat to narrow. In most people, this narrowing does not affect breathing. In others, it can cause snoring, sometimes with reduced or completely blocked airflow. A completely blocked airway without airflow is called an obstructive apnea. Partial obstruction with diminished airflow is called a hypopnea. A person may have apnea and hypopnea during sleep. Insufficient breathing due to apnea or hypopnea causes oxygen levels to fall and carbon dioxide to rise. Because the airway is blocked, breathing faster or harder does not help to improve oxygen levels until the airway is reopened. Typically, the obstruction requires the person to awaken to activate the upper airway muscles.  Once the airway is opened, the person then takes several deep breaths to catch up on breathing. As the person awakens, he or she may move briefly, snort or snore, and take a deep breath. Less frequently, a person may awaken completely with a sensation of gasping, smothering, or choking. If the person falls back to sleep quickly, he or she will not remember the event. Many people with sleep apnea are unaware of their abnormal breathing in sleep, and all patients underestimate how often their sleep is interrupted. Awakening from sleep causes sleep to be unrefreshing and causes fatigue and daytime sleepiness. Anatomic causes of obstructive sleep apnea --  Most patients have LENA because of a small upper airway. As the bones of the face and skull develop, some people develop a small lower face, a small mouth, and a tongue that seems too large for the mouth. These features are genetically determined, which explains why LENA tends to cluster in families. Obesity is another major factor. Tonsil enlargement can be an important cause, especially in children. SLEEP APNEA SYMPTOMS -- The main symptoms of LENA are loud snoring, fatigue, and daytime sleepiness. However, some people have no symptoms. For example, if the person does not have a bed partner, he or she may not be aware of the snoring. Fatigue and sleepiness have many causes and are often attributed to overwork and increasing age. As a result, a person may be slow to recognize that they have a problem. A bed partner or spouse often prompts the patient to seek medical care. Other symptoms may include one or more of the following:  ?Restless sleep  ? Awakening with choking, gasping, or smothering  ? Morning headaches, dry mouth, or sore throat  ? Waking frequently to urinate  ? Awakening unrested, groggy  ? Low energy, difficulty concentrating, memory impairment    Risk factors -- Certain factors increase the risk of sleep apnea.   ?Increasing age - LENA occurs at all ages, but it is more common in middle and older age adults. ?Male sex - LENA is two times more common in men, especially in middle age. ?Obesity - The more obese a person is, the more likely he or she is to have LENA. ? Sedation from medication or alcohol - This interferes with the ability to awaken from sleep and can lengthen periods of apnea (no breathing), with potentially dangerous consequences. ? Abnormality of the airway. SLEEP APNEA CONSEQUENCES -- Complications of sleep apnea can include daytime sleepiness and difficulty concentrating. The consequence of this is an increased risk of accidents and errors in daily activities. Studies have shown that people with severe LENA are more than twice as likely to be involved in a motor vehicle accident as people without these conditions. People with LENA are encouraged to discuss options for driving, working, and performing other high-risk tasks with a healthcare provider. In addition, people with untreated LENA may have an increased risk of cardiovascular problems such as high blood pressure, heart attack, abnormal heart rhythms, or stroke. This risk may be due to changes in the heart rate and blood pressure that occur during sleep. SLEEP APNEA DIAGNOSIS -- The diagnosis of LENA is best made by a knowledgeable sleep medicine specialist who has an understanding of the individual's health issues. The diagnosis is usually based upon the person's medical history, physical examination, and testing, including:  ? A complaint of snoring and ineffective sleep  ? Neck size (greater than 16 inches in men or 14 inches in women) is associated with an increased risk of sleep apnea  ? A small upper airway: difficulty seeing the throat because of a tongue that is large for the mouth  ? High blood pressure, especially if it is resistant to treatment  ? If a bed partner has observed the patient during episodes of stopped breathing (apnea), choking, or gasping during sleep, there is a strong possibility of sleep apnea. Testing is usually performed in a sleep laboratory. A full sleep study is called a polysomnogram. The polysomnogram measures the breathing effort and airflow, blood oxygen level, heart rate and rhythm, duration of the various stages of sleep, body position, and movement of the arms/legs. Home monitoring devices are available that can perform a sleep study. This is a reasonable alternative to conventional testing in a sleep laboratory if the clinician strongly suspects moderate or severe sleep apnea and the patient does not have other illnesses or sleep disorders that may interfere with the results. SLEEP APNEA TREATMENT -- Sleep apnea is best treated by a knowledgeable sleep medicine specialist. The goal of treatment is to maintain an open airway during sleep. Effective treatment will eliminate the symptoms of sleep disturbance; long-term health consequences are also reduced. Most treatments require nightly use. The challenge for the clinician and the patient is to select an effective therapy that is appropriate for the patient's problem and that is acceptable for long term use. Auto-titrating CPAP delivers an amount of PAP that varies during the night. The variation is dependent on event detection software algorithms, which will increase the pressure gradually in response to flow changes until adequate patency is detected. After a period of sustained upper airway patency, the delivered level of pressure gradually decreases until the algorithm identifies recurrent upper airway obstruction, at which point the delivered pressure again increases. The result is that the delivered pressure varies throughout the night, in an effort to provide the lowest pressure that is necessary to maintain upper airway patency. Continuous positive airway pressure (CPAP) -- The most effective treatment for sleep apnea uses air pressure from a mechanical device to keep the upper airway open during sleep.  A CPAP (continuous positive airway pressure)  device uses an air-tight attachment to the nose, typically a mask, connected to a tube and a blower which generates the pressure. Devices that fit comfortably into the nasal opening, rather than over the nose, are also available. CPAP should be used any time the person sleeps (day or night). The CPAP device is usually used for the first time in the sleep lab, where a technician can adjust the pressure and select the best equipment to keep the airway open. Alternatively, an auto device with a self-adjusting pressure feature, provided with proper education and training, can get treatment started without another sleep test. While the treatment may seem uncomfortable, noisy, or bulky at first, most people accept the treatment after experiencing better sleep. However, difficulty with mask comfort and nasal congestion prevent up to 50 percent of people from using the treatment on a regular basis. Continued follow up with a healthcare provider helps to ensure that the treatment is effective and comfortable. Information from the CPAP machine is often used by physicians, therapists, and insurers to track the success of treatment. CPAP can be delivered with different features to improve comfort and solve problems that may come up during treatment. Changes in treatment may be needed if symptoms do not improve or if the persons condition changes, such as a gain or loss of weight. Adjust sleep position -- Adjusting sleep position (to stay off the back) may help improve sleep quality in people who have LENA when sleeping on the back. However, this is difficult to maintain throughout the night and is rarely an adequate solution. Weight loss -- Weight loss may be helpful for obese or overweight patients. Weight loss may be accomplished with dietary changes, exercise, and/or surgical treatment.  However, it can be difficult to maintain weight loss; the five-year success of non-surgical weight loss is only 5 percent, meaning that 95 percent of people regain lost weight. Avoid alcohol and other sedatives -- Alcohol can worsen sleepiness, potentially increasing the risk of accidents or injury. People with LENA are often counseled to drink little to no alcohol, even during the daytime. Similarly, people who take anti-anxiety medications or sedatives to sleep should speak with their healthcare provider about the safety of these medications. People with LENA must notify all healthcare providers, including surgeons, about their condition and the potential risks of being sedated. People with LENA who are given anesthesia and/or pain medications require special management and close monitoring to reduce the risk of a blocked airway. Dental devices -- A dental device, called an oral appliance or mandibular advancement device, can reposition the jaw (mandible), bringing the tongue and soft palate forward as well. This may relieve obstruction in some people. This treatment is excellent for reducing snoring, although the effect on LENA is sometimes more limited. As a result, dental devices are best used for mild cases of LENA when relief of snoring is the main goal. Failure to tolerate and accept CPAP is another indication for dental devices. While dental devices are not as effective as CPAP for LENA, some patients prefer a dental device to CPAP. Side effects of dental devices are generally minor but may include changes to the bite with prolonged use. Surgical treatment -- Surgery is an alternative therapy for patients who cannot tolerate or do not improve with nonsurgical treatments such as CPAP or oral devices. Surgery can also be used in combination with other nonsurgical treatments. Surgical procedures reshape structures in the upper airways or surgically reposition bone or soft tissue. Uvulopalatopharyngoplasty (UPPP) removes the uvula and excessive tissue in the throat, including the tonsils, if present. Other procedures, such as maxillomandibular advancement (MMA), address both the upper and lower pharyngeal airway more globally. UPPP alone has limited success rates (less than 50 percent) and people can relapse (when LENA symptoms return after surgery). As a result, this surgery is only recommended in a minority of people and should be considered with caution. MMA may have a higher success rate, particularly in people with abnormal jaw (maxilla and mandible) anatomy, but it is the most complicated procedure. A newer surgical approach, nerve stimulation to protrude the tongue, has promising success rates in very selected people. Tracheostomy creates a permanent opening in the neck. It is reserved for people with severe disease in whom less drastic measures have failed or are inappropriate. Although it is always successful in eliminating obstructive sleep apnea, tracheostomy requires significant lifestyle changes and carries some serious risks (eg, infection, bleeding, blockage). All surgical treatments require discussions about the goals of treatment, the expected outcomes, and potential complications. Hypoglossal nerve stimulator- \"Inspire\" device    PAP treatment failure:  Possible causes of treatment failure include nonadherence or suboptimal adherence, weight gain, an inappropriate level of prescribed positive pressure, or an additional disorder causing sleepiness (eg, narcolepsy) that may require alterations in the therapeutic regimen. A review of medications should also be undertaken since many drugs may lead to sleepiness. Inadequate sleep time may also negate the expected effects from treatment of LENA. Also, pt's can have persistent hypersomnolence associated with sleep apnea even in the presence of adequate therapy and at those times Provigil or Nuvigil or other stimulants may be indicated.     Once the patient's positive airway pressure therapy has been optimized and symptoms resolved, a regimen of next office visit or have your supplier fax it to our office prior to your office visit. Note:  Where applicable, we will utilize PAP device efficiency reports, additional testing, and face-to-face  clinical evaluation subsequent to any treatment, changes in treatment, and continued treatment. Caution:  Please abstain from driving or engaging in other activities which may be hazardous in the presence of diminished alertness or daytime drowsiness. And avoid the use of sedatives or alcohol, which can worsen sleep apnea and daytime drowsiness. Mask suggestions:  -     Resmed Airfit N20 (Nasal) or F20 (Full face mask). They conform to your face, thus decreasing the potential for mask leakage. You might like the AirTouch F20(full face mask). It has a \"memory foam\" like cushion. The AirFit F30 is a smaller style full face mask designed to sit low on and cover less of your face for fewer facial marks. AirFit N30i has a top of the head tube with a nasal mask. AirFit P10 reported to be the most comfortable nasal pillow mask. Resmed Mirage FX reported to be the most comfortable nasal mask. Resmed Mirage Daggett reported to be the most comfortable hybrid mask. AirTouch N20-memory foam nasal mask. Respironics: You might also like to try a nasal mask called a Dreamwear nasal mask or the Dreamwear nasal pillow. Another suggestion is the [facility], it is a minimal contact full face mask. The Verdin Johann incredible under the nose design makes it the only full face mask that won't cause red marks on the bridge of your nose when compared to other full face masks. The Dreamwear full face mask has a  soft feel, unique in-frame air-flow, and innovative air tube connection at the top of the head for the ultimate in sleep comfort. Comfort Gel Blue. Dreamwear gel pillows.     [employer]: [place] Moron nasal pillow mask and Simplus FFM    The use of a memory foam CPAP pillow supports the head and neck throughout the night. Patient Education        Doppler Ultrasound: About This Test  What is it? An ultrasound uses reflected sound waves to produce a picture of organs and blood vessels. The sound waves create a picture on a video screen. Doppler ultrasound is a special kind of ultrasound. It can detect movement of blood through arteries and veins. Some ultrasound tests are called \"duplex. \" Duplex means \"two parts. \" A duplex ultrasound combines the Doppler ultrasound with the more common ultrasound. The combination can help the doctor see more clearly what's going on. There are no risks linked to an ultrasound test, and it is safe for pregnant women. It won't harm the baby (fetus). Why is this test done? You might have a Doppler ultrasound to:  · Look for reduced blood flow in major neck arteries. Low blood flow in these arteries can cause a stroke. · Find a blood clot in leg veins, which could be a deep vein thrombosis. · Check blood flow in a fetus to check the health of the fetus. · Find a blockage or a narrowing in the arteries that go to the kidneys. How do you prepare for the test?  Depending on what the test is for, you may be asked not to eat or drink after midnight before the test. Or you may be asked to drink water right before the test so that your bladder is full. How is the test done? The doctor or ultrasound technologist will have you lie on your back, side, or stomach, depending on which part of your body is being examined. · A gel will be applied to your skin. This helps the passage of sound waves. · A hand-held device called a transducer will be moved along your skin. · You'll need to stay still during the test.  How long does the test take? The test will take 30 to 60 minutes. What happens after the test?  · The scans from the test will be read within a short time, in case a repeat test is needed. · You will probably be able to go home as soon as the test has been read.   · You can go back to your usual activities right away. Follow-up care is a key part of your treatment and safety. Be sure to make and go to all appointments, and call your doctor if you are having problems. It's also a good idea to keep a list of the medicines you take. Ask your doctor when you can expect to have your test results. Where can you learn more? Go to [email or web address] and sign in to your [employer] account. Enter H392 in the SeptRx box to learn more about \"Doppler Ultrasound: About This Test.\"     If you do not have an account, please click on the \"Sign Up Now\" link. Current as of: December 9, 2019               Content Version: 12.5  © 2898-5237 [employer]. Care instructions adapted under license by [facility] ([facility]). If you have questions about a medical condition or this instruction, always ask your healthcare professional. [address] any warranty or liability for your use of this information.

## 2020-10-07 ENCOUNTER — TELEPHONE (OUTPATIENT)
Dept: NEUROLOGY | Age: 70
End: 2020-10-07

## 2020-10-30 ENCOUNTER — HOSPITAL ENCOUNTER (OUTPATIENT)
Dept: VASCULAR LAB | Age: 70
Discharge: HOME | End: 2020-10-30
Payer: MEDICARE

## 2020-10-30 PROCEDURE — 93880 EXTRACRANIAL BILAT STUDY: CPT

## 2020-11-04 ENCOUNTER — TELEPHONE (OUTPATIENT)
Dept: NEUROLOGY | Age: 70
End: 2020-11-04

## 2020-11-04 NOTE — TELEPHONE ENCOUNTER
----- Message from Martha Pinto, [place] sent at 11/3/2020  2:08 PM CST -----  The NICS is significant for 70%-90% stenosis of the left internal carotid artery (that is where I heard the bruit). The right internal carotid artery is not significant. She needs a referral to Vascular. Would she agree with [facility]?

## 2020-11-12 ENCOUNTER — OFFICE VISIT (OUTPATIENT)
Dept: VASCULAR SURGERY | Age: 70
End: 2020-11-12
Payer: MEDICARE

## 2020-11-12 VITALS
OXYGEN SATURATION: 97 % | DIASTOLIC BLOOD PRESSURE: 63 MMHG | BODY MASS INDEX: 50.05 KG/M2 | TEMPERATURE: 97.3 F | HEIGHT: 62 IN | RESPIRATION RATE: 18 BRPM | SYSTOLIC BLOOD PRESSURE: 164 MMHG | HEART RATE: 78 BPM | WEIGHT: 272 LBS

## 2020-11-12 PROCEDURE — 3017F COLORECTAL CA SCREEN DOC REV: CPT | Performed by: PHYSICIAN ASSISTANT

## 2020-11-12 PROCEDURE — G8427 DOCREV CUR MEDS BY ELIG CLIN: HCPCS | Performed by: PHYSICIAN ASSISTANT

## 2020-11-12 PROCEDURE — 1036F TOBACCO NON-USER: CPT | Performed by: PHYSICIAN ASSISTANT

## 2020-11-12 PROCEDURE — 1123F ACP DISCUSS/DSCN MKR DOCD: CPT | Performed by: PHYSICIAN ASSISTANT

## 2020-11-12 PROCEDURE — G8484 FLU IMMUNIZE NO ADMIN: HCPCS | Performed by: PHYSICIAN ASSISTANT

## 2020-11-12 PROCEDURE — G8400 PT W/DXA NO RESULTS DOC: HCPCS | Performed by: PHYSICIAN ASSISTANT

## 2020-11-12 PROCEDURE — 4040F PNEUMOC VAC/ADMIN/RCVD: CPT | Performed by: PHYSICIAN ASSISTANT

## 2020-11-12 PROCEDURE — 1090F PRES/ABSN URINE INCON ASSESS: CPT | Performed by: PHYSICIAN ASSISTANT

## 2020-11-12 PROCEDURE — 99214 OFFICE O/P EST MOD 30 MIN: CPT | Performed by: PHYSICIAN ASSISTANT

## 2020-11-12 PROCEDURE — G8417 CALC BMI ABV UP PARAM F/U: HCPCS | Performed by: PHYSICIAN ASSISTANT

## 2020-11-12 NOTE — PROGRESS NOTES
Patient Care Team:  Andrew Padilla MD as PCP - General (Internal Medicine)  Anrdew Padilla MD as PCP - [address] [place] [address]  [facility] Provider  TEDDY June as Physician Assistant (Physician Assistant Medical)      History and Physical:  Mrs. Francisco Martinez is a 70 yo female who has a history that includes LENA and uses a CPAP. She was referred to us by TEDDY June for evaluation of a critical left ICA stenosis found on NICS after a left bruit was noted. This is a new diagnosis for the patient. She is not on antiplatelet or statin therapy. She had been on ASA but because it caused severe GI upset she was told to stop this by her PCP. She denies a history of CVA. She reports no episodes of speech difficutlies or episodes of lateralizing weakness/numbness/tingling. She denies any severe dizziness, syncopal episodes or tunnel vision. She reports that over the last 2 months she has had some visual disturbance in the left eye. She reports that she has episodes where her left eye vision decreases and then she sees \"black spots\" in her visual field. She reports that she saw her eye Doctor and was told that he saw something in her eye and she needed to see Vascular for this and needed a carotid US.        Kody Reyes is a 69 y.o. female with the following history reviewed and recorded in [facility]:  Patient Active Problem List    Diagnosis Date Noted    Obstructive sleep apnea     Restless leg syndrome     CPAP (continuous positive airway pressure) dependence      8cm       Current Outpatient Medications   Medication Sig Dispense Refill    ketoconazole (NIZORAL) 2 % cream APPLY TO AFFECTED AREA TWICE A DAY  3    prednisoLONE acetate (PRED FORTE) 1 % ophthalmic suspension ISTILL 1 DROP IN EACH EYE 4 TIMES DAILY FOR 7 DAYS  0    clotrimazole-betamethasone (LOTRISONE) 1-0.05 % cream 1 (ONE) APPLICATION TO AFFECTED AREA TWO TIMES DAILY      tobramycin (TOBREX) 0.3 % ophthalmic solution INSTILL 1 DROP IN [facility] EYE 4 TIMES DAILY FOR 7 DAYS  0    triamcinolone (KENALOG) 0.1 % cream APPLY TO AFFECTED AREA TWICE A DAY  3    hydrALAZINE (APRESOLINE) 25 MG tablet Take 25 mg by mouth      ranitidine (ZANTAC) 150 MG tablet Take 150 mg by mouth 2 times daily      allopurinol (ZYLOPRIM) 100 MG tablet TAKE 1 TABLET BY MOUTH DAILY 300mg      docusate sodium (COLACE) 100 MG capsule Take 100 mg by mouth 4 times daily      furosemide (LASIX) 40 MG tablet TAKE 1 TABLET BY MOUTH TWICE A DAY  3    cyanocobalamin 1000 MCG/ML injection ML INTRAMUSCULAR INJECT INJECT 1 ML INTRAMUSCULARLY EVERY 2 WEEKS  2    DULoxetine (CYMBALTA) 60 MG capsule 1 CAPSULE BY MOUTH DAILY  3    ZETIA 10 MG tablet TAKE 1 TABLET BY MOUTH EVERY DAY  3    FREESTYLE LITE strip 1 ITEM DAILY TEST BLOOD SUGAR ONCE DAILY. DX:E11.9  3    levothyroxine (SYNTHROID) 125 MCG tablet TAKE 1 TABLET DAILY FOR THYROID.  2    metFORMIN (GLUCOPHAGE) 1000 MG tablet 500 mg 1000mg BID  11    potassium chloride (MICRO-K) 10 MEQ CR capsule TAKE 1 CAPSULE BY MOUTH EVERY DAY  3    propranolol (INDERAL) 10 MG tablet TAKE 1 TABLET BY MOUTH TWICE A DAY  0    vitamin E 400 UNIT capsule Take 400 Units by mouth daily      trandolapril-verapamil (TARKA) 4-240 MG per tablet Take 1 tablet by mouth daily      SENNA CO by Combination route      CPAP Machine MISC by Does not apply route       No current facility-administered medications for this visit. Allergies: Latex; Ibuprofen; Asa [aspirin]; Bee venom; Betadine [povidone iodine]; Codeine; Donnatal [phenobarbital-belladonna alk]; Fulvicin-p-g [griseofulvin]; Tape [adhesive tape]; Lortab [hydrocodone-acetaminophen]; and Wasp venom  Past Medical History:   Diagnosis Date    CPAP (continuous positive airway pressure) dependence     8cm to 16cm    Heart murmur     Obstructive sleep apnea     AHI:  20.5;  In REM AHI of 56.5 per PSG, 7/2010    Restless leg syndrome      Past Surgical History:   Procedure Laterality Date    BREAST BIOPSY Left  CARDIAC CATHETERIZATION      CATARACT REMOVAL Right     COLONOSCOPY  2004 and 2008    FOOT SURGERY Right     mortons     HYSTERECTOMY      KNEE SURGERY Left     replacement    RECTAL SURGERY      abcess gland    STOMACH SURGERY      tumor removed     Family History   Problem Relation Age of Onset    Other Sister         blood clots     Social History     Tobacco Use    Smoking status: Never Smoker    Smokeless tobacco: Never Used   Substance Use Topics    Alcohol use: No       Old records have been obtained from the referring provider. These records have been reviewed and summarized. Review of Systems    Constitutional - no significant activity change, appetite change, or unexpected weight change. No fever or chills. No diaphoresis or significant fatigue. HENT - no significant rhinorrhea or epistaxis. No tinnitus or significant hearing loss. Eyes - no sudden vision change or amaurosis. Respiratory - no significant shortness of breath, wheezing, or stridor. No apnea, cough, or chest tightness associated with shortness of breath. Cardiovascular - no chest pain, syncope, or significant dizziness. No palpitations or significant leg swelling. No claudication. Gastrointestinal - no abdominal swelling or pain. No blood in stool. No severe constipation, diarrhea, nausea, or vomiting. Genitourinary - No difficulty urinating, dysuria, frequency, or urgency. No flank pain or hematuria. Musculoskeletal - no back pain, gait disturbance, or myalgia. Skin - no color change, rash, pallor, or new wound. Neurologic - no dizziness, facial asymmetry, or light headedness. No seizures. No, AF,  speech difficulty or lateralizing weakness. Hematologic - no easy bruising or excessive bleeding. Psychiatric - no severe anxiety or nervousness. No confusion. All other review of systems are negative.       Physical Exam    BP (!) 164/63 (Site: Right Upper Arm)   Pulse 78   Temp 97.3 °F (36.3 °C) (Temporal)   Resp 18   Ht 5' 2\" (1.575 m)   Wt 272 lb (123.4 kg)   SpO2 97%   BMI 49.75 kg/m²     Constitutional - well developed, well nourished. No diaphoresis or acute distress. HENT - head normocephalic. Right external ear canal appears normal.  Left external ear canal appears normal.  Septum appears midline. Eyes - conjunctiva normal.  EOMS normal.  No exudate. No icterus. Neck- ROM appears normal, no tracheal deviation. Cardiovascular - Regular rate and rhythm. Heart sounds are normal.  No murmur, rub, or gallop. Carotid pulses are 2+ to palpation bilaterally without bruit. Extremities - Radial and ulnar pulses are 2+ to palpation bilaterally. No cyanosis, clubbing, or significant edema. No signs atheroembolic event. Pulmonary - effort appears normal.  No respiratory distress. Lungs - Breath sounds normal. No wheezes or rales. GI - Abdomen - soft, non tender, bowel sounds X 4 quadrants. No guarding or rebound tenderness. No distension or palpable mass. Genitourinary - deferred. Musculoskeletal - ROM appears normal.  No significant edema. Neurologic - alert and oriented X 3. Physiologic. Tongue midline. Face symmetric. No lateralizing weakness noted. Skin - warm, dry, and intact. No rash, erythema, or pallor. Psychiatric - mood, affect, and behavior appear normal.  Judgment and thought processes appear normal.    Risk factors for atherosclerosis of all vascular beds have been reviewed with the patient including:  Family history, tobacco abuse in all forms, elevated cholesterol, hyperlipidemia, and diabetes.       Carotid Doppler results:      Impression          There is mixed plaque visualized in the bilateral internal carotid     arteries.    Hallie Jessie is less than 50% stenosis in the right internal carotid artery.     There is 70-99% stenosis of the left internal carotid artery.     There is normal antegrade flow in the bilateral vertebral arteries.          Signature       ----------------------------------------------------------------     Electronically signed by Deneen FIELDSInterpreting     [email or web address]) on 10/30/2020 12:54 PM     ----------------------------------------------------------------         Blood Pressure:Right arm 184/98 mmHg. Left arm 180/96 mmHg.         Velocities are measured in cm/s ; Diameters are measured in mm         Carotid Right Measurements    +------------+-------+-------+--------+-------+------------+---------------+    ! Location    !PSV    !EDV    ! Angle   !RI     !%Stenosis   ! Tortuosity     !    +------------+-------+-------+--------+-------+------------+---------------+    ! Prox CCA    !99.8   !16.5   !60      !0.83   !            !               !    +------------+-------+-------+--------+-------+------------+---------------+    ! Mid CCA     !127    !18.9   !60      !0.85   !            !               !    +------------+-------+-------+--------+-------+------------+---------------+    ! Prox ICA    !90.4   !21.2   !60      !0.77   !            !               !    +------------+-------+-------+--------+-------+------------+---------------+    ! Mid ICA     !95.9   !22     !60      !0.77   !            !               !    +------------+-------+-------+--------+-------+------------+---------------+    ! Dist ICA    !122    !30.6   !60      !0.75   !            !               !    +------------+-------+-------+--------+-------+------------+---------------+    ! Prox ECA    !90.4   !12.6   !60      !0.86   !            !               !    +------------+-------+-------+--------+-------+------------+---------------+    ! Vertebral   !50.3   !14.9   !60      !0.7    !            !               !    +------------+-------+-------+--------+-------+------------+---------------+           - There is antegrade vertebral flow noted on the right side.           - Additional Measurements:ICAPSV/CCAPSV 1.22. ICAEDV/CCAEDV 1.85.         Carotid Left Measurements next few days. We also discussed the need to use contrast for the CT. She had a normal creatinine and GFR 6/2020. Addendum: 11/13/2020  CT called stating the patient's creatinine was 1.6, GFR 32. We will cancel CT and pre treat the patient with IVF's pre and post CT. We will call the patient with the results and further recommendations. CTA head/neck - (reviewed by Dr. López Guajardo)  FINDINGS:    ANGIOGRAM:    Typical three-vessel branching pattern off the aortic arch. . There is    no flow-limiting stenosis at the main arch origins. Normal course and    caliber of the common carotid arteries. The minimum luminal diameter at the left internal carotid origin    measures less than 1 mm. Diameter is quite narrow at the origin,    imaging as a string sign. The downstream caliber is measured at 4.7    mm. The minimum luminal diameter of the right internal carotid artery    origin measures 5.2 mm, in comparison to a downstream caliber of 5.1    mm. Posteriorly, the vertebral arteries and visualized basilar artery are    unremarkable. . Right vertebral artery is dominant. Distal carotid arteries are patent. Bilateral anterior and middle    cerebral artery branching appear symmetric. Intracranial vertebral    arteries and basilar artery are normal in caliber. The posterior    cerebral artery branching is symmetric. No large vessel occlusion or    flow-limiting stenosis. OTHER FINDINGS: No acute intracranial process is identified. Specifically, no midline shift, mass effect, or intracranial    hemorrhage. Lung apices are clear. Thymic gland is atrophic. No    cervical adenopathy. Fatty infiltration of the major salivary glands. Orbital contents are unremarkable. Advanced cervical spine    degenerative change with reversed lordosis.         Impression    1.  High-grade atheromatous narrowing at the left internal carotid    artery origin, estimated at greater than 90% according to the NASCET calculation and appearing as a \"string sign. \"    2. There is essentially no plaque identified in the right carotid bulb    and there is certainly no flow-limiting stenosis at the right internal    carotid origin. 3. No intracranial flow-limiting stenosis or large vessel occlusion. Signed by Dr Janay Du on 11/16/2020 12:07 PM           Images of CTA head/neck reviewed by Dr. Nagi Rodas. She recommends proceeding with a Left Carotid Endarterectomy. Options have been discussed with the patient including continued medical management vs. proceeding with Left Carotid Endarterectomy to reduce the risk of possible stroke. Patient has opted to proceed with Left Carotid Endarterectomy. Risks have been discussed with the patient including but not limited to MI, death, CVA, bleed, nerve injury, and infection.         Proceed with a Left Carotid Endarterectomy

## 2020-11-13 ENCOUNTER — HOSPITAL ENCOUNTER (OUTPATIENT)
Dept: CT IMAGING | Age: 70
Discharge: HOME | End: 2020-11-13
Payer: MEDICARE

## 2020-11-13 ENCOUNTER — TELEPHONE (OUTPATIENT)
Dept: VASCULAR SURGERY | Age: 70
End: 2020-11-13

## 2020-11-13 LAB
GFR AFRICAN AMERICAN: 38
GFR NON-AFRICAN AMERICAN: 32
PERFORMED ON: ABNORMAL
POC CREATININE: 1.6 MG/DL (ref 0.3–1.3)
POC SAMPLE TYPE: ABNORMAL

## 2020-11-13 PROCEDURE — 82565 ASSAY OF CREATININE: CPT

## 2020-11-13 NOTE — TELEPHONE ENCOUNTER
Received call from [address] [place] [address] at [facility]. She reports patients Creatine is 1.55 and GFR is 33.  We will rescheduled for Monday 11/16/2020 to arrive at 7:30 for fluids pre and post. I left voicemail for patient to call me back at earliest convenience

## 2020-11-16 ENCOUNTER — HOSPITAL ENCOUNTER (OUTPATIENT)
Dept: CT IMAGING | Age: 70
Discharge: HOME | End: 2020-11-16
Payer: MEDICARE

## 2020-11-16 PROCEDURE — 70496 CT ANGIOGRAPHY HEAD: CPT

## 2020-11-16 PROCEDURE — 2580000003 HC RX 258: Performed by: PHYSICIAN ASSISTANT

## 2020-11-16 PROCEDURE — 2500000003 HC RX 250 WO HCPCS: Performed by: PHYSICIAN ASSISTANT

## 2020-11-16 PROCEDURE — 6360000004 HC RX CONTRAST MEDICATION: Performed by: PHYSICIAN ASSISTANT

## 2020-11-16 PROCEDURE — 70498 CT ANGIOGRAPHY NECK: CPT

## 2020-11-16 RX ADMIN — SODIUM BICARBONATE: 84 INJECTION, SOLUTION INTRAVENOUS at 08:10

## 2020-11-16 RX ADMIN — IOPAMIDOL 90 ML: 755 INJECTION, SOLUTION INTRAVENOUS at 11:54

## 2020-11-17 ENCOUNTER — TELEPHONE (OUTPATIENT)
Dept: VASCULAR SURGERY | Age: 70
End: 2020-11-17

## 2020-11-17 RX ORDER — CLOPIDOGREL BISULFATE 75 MG/1
75 TABLET ORAL DAILY
Qty: 30 TABLET | Refills: 0 | Status: SHIPPED | OUTPATIENT
Start: 2020-11-17 | End: 2021-10-08 | Stop reason: CLARIF

## 2020-11-17 NOTE — TELEPHONE ENCOUNTER
I sw the pt to let her know earlier when we were reviewing her surgery details I forgot to mention we will need to pre-tx her with bactroban 5 days prior to her surgery. The pt will need to start this medication tomorrow and needs to apply it to each swift BID. I also instructed the pt to  her Plavix 75 mg and to take this once daily starting tomorrow as well. Pt voiced understanding and will  each of these medications to take as instructed.
will  a copy of these instructions on 11/19/20 after pre-op testing.

## 2020-11-19 ENCOUNTER — HOSPITAL ENCOUNTER (OUTPATIENT)
Dept: GENERAL RADIOLOGY | Age: 70
Discharge: HOME | End: 2020-11-19
Payer: MEDICARE

## 2020-11-19 ENCOUNTER — HOSPITAL ENCOUNTER (OUTPATIENT)
Dept: PREADMISSION TESTING | Age: 70
Discharge: HOME | End: 2020-11-23
Payer: MEDICARE

## 2020-11-19 VITALS — BODY MASS INDEX: 49.69 KG/M2 | HEIGHT: 62 IN | WEIGHT: 270 LBS

## 2020-11-19 LAB
ABO/RH: NORMAL
ANION GAP SERPL CALCULATED.3IONS-SCNC: 9 MMOL/L (ref 7–19)
ANTIBODY SCREEN: NORMAL
APTT: 30.6 SEC (ref 26–36.2)
BUN BLDV-MCNC: 21 MG/DL (ref 8–23)
CALCIUM SERPL-MCNC: 9.4 MG/DL (ref 8.8–10.2)
CHLORIDE BLD-SCNC: 101 MMOL/L (ref 98–111)
CO2: 30 MMOL/L (ref 22–29)
CREAT SERPL-MCNC: 1.3 MG/DL (ref 0.5–0.9)
GFR AFRICAN AMERICAN: 49
GFR NON-AFRICAN AMERICAN: 40
GLUCOSE BLD-MCNC: 174 MG/DL (ref 74–109)
HCT VFR BLD CALC: 33.2 % (ref 37–47)
HEMOGLOBIN: 10.1 G/DL (ref 12–16)
INR BLD: 1.07 (ref 0.88–1.18)
MCH RBC QN AUTO: 27.4 PG (ref 27–31)
MCHC RBC AUTO-ENTMCNC: 30.4 G/DL (ref 33–37)
MCV RBC AUTO: 90 FL (ref 81–99)
PDW BLD-RTO: 15.7 % (ref 11.5–14.5)
PLATELET # BLD: 318 K/UL (ref 130–400)
PMV BLD AUTO: 10.5 FL (ref 9.4–12.3)
POTASSIUM SERPL-SCNC: 4.3 MMOL/L (ref 3.5–5)
PROTHROMBIN TIME: 13.8 SEC (ref 12–14.6)
RBC # BLD: 3.69 M/UL (ref 4.2–5.4)
SODIUM BLD-SCNC: 140 MMOL/L (ref 136–145)
WBC # BLD: 9.1 K/UL (ref 4.8–10.8)

## 2020-11-19 PROCEDURE — 85610 PROTHROMBIN TIME: CPT

## 2020-11-19 PROCEDURE — 85027 COMPLETE CBC AUTOMATED: CPT

## 2020-11-19 PROCEDURE — 85730 THROMBOPLASTIN TIME PARTIAL: CPT

## 2020-11-19 PROCEDURE — 86901 BLOOD TYPING SEROLOGIC RH(D): CPT

## 2020-11-19 PROCEDURE — 80048 BASIC METABOLIC PNL TOTAL CA: CPT

## 2020-11-19 PROCEDURE — 71046 X-RAY EXAM CHEST 2 VIEWS: CPT

## 2020-11-19 PROCEDURE — 86850 RBC ANTIBODY SCREEN: CPT

## 2020-11-19 PROCEDURE — 93005 ELECTROCARDIOGRAM TRACING: CPT

## 2020-11-19 PROCEDURE — 87081 CULTURE SCREEN ONLY: CPT

## 2020-11-19 PROCEDURE — 86900 BLOOD TYPING SEROLOGIC ABO: CPT

## 2020-11-19 RX ORDER — FAMOTIDINE 20 MG/1
20 TABLET, FILM COATED ORAL DAILY
COMMUNITY

## 2020-11-19 RX ORDER — LISINOPRIL 20 MG/1
20 TABLET ORAL DAILY
COMMUNITY

## 2020-11-19 RX ORDER — VERAPAMIL HYDROCHLORIDE 240 MG/1
240 CAPSULE, EXTENDED RELEASE ORAL NIGHTLY
COMMUNITY
End: 2022-10-10 | Stop reason: ALTCHOICE

## 2020-11-19 RX ORDER — AMOXICILLIN 250 MG
2 CAPSULE ORAL DAILY
COMMUNITY

## 2020-11-20 ENCOUNTER — TELEPHONE (OUTPATIENT)
Dept: VASCULAR SURGERY | Age: 70
End: 2020-11-20

## 2020-11-20 ENCOUNTER — PREP FOR PROCEDURE (OUTPATIENT)
Dept: VASCULAR SURGERY | Age: 70
End: 2020-11-20

## 2020-11-20 LAB — MRSA CULTURE ONLY: NORMAL

## 2020-11-20 RX ORDER — SODIUM CHLORIDE 0.9 % (FLUSH) 0.9 %
10 SYRINGE (ML) INJECTION EVERY 12 HOURS SCHEDULED
Status: CANCELLED | OUTPATIENT
Start: 2020-11-20

## 2020-11-20 RX ORDER — SODIUM CHLORIDE 0.9 % (FLUSH) 0.9 %
10 SYRINGE (ML) INJECTION PRN
Status: CANCELLED | OUTPATIENT
Start: 2020-11-20

## 2020-11-20 NOTE — H&P
Patient Care Team:  Andrew Blank MD as PCP - General (Internal Medicine)  Andrew Blank MD as PCP - [facility]  TEDDY Morley as Physician Assistant (Physician Assistant Medical)      History and Physical:  Mrs. Stanford Tineo is a 68 yo female who has a history that includes LENA and uses a CPAP. She was referred to us by TEDDY Morley for evaluation of a critical left ICA stenosis found on NICS after a left bruit was noted. This is a new diagnosis for the patient. She is not on antiplatelet or statin therapy. She had been on ASA but because it caused severe GI upset she was told to stop this by her PCP. She denies a history of CVA. She reports no episodes of speech difficutlies or episodes of lateralizing weakness/numbness/tingling. She denies any severe dizziness, syncopal episodes or tunnel vision. She reports that over the last 2 months she has had some visual disturbance in the left eye. She reports that she has episodes where her left eye vision decreases and then she sees \"black spots\" in her visual field. She reports that she saw her eye Doctor and was told that he saw something in her eye and she needed to see Vascular for this and needed a carotid US.        Winifred Lunsford is a 69 y.o. female with the following history reviewed and recorded in [facility]:  Patient Active Problem List    Diagnosis Date Noted    Obstructive sleep apnea     Restless leg syndrome     CPAP (continuous positive airway pressure) dependence      8cm       Current Outpatient Medications   Medication Sig Dispense Refill    verapamil (VERELAN) 240 MG extended release capsule Take 240 mg by mouth nightly      lisinopril (PRINIVIL;ZESTRIL) 20 MG tablet Take 20 mg by mouth daily      famotidine (PEPCID) 20 MG tablet Take 20 mg by mouth daily      senna-docusate (PERICOLACE) 8.6-50 MG per tablet Take 2 tablets by mouth daily      fluocinonide (LIDEX) 0.05 % cream Apply topically 2 times daily Apply topically 2 times daily.  clopidogrel (PLAVIX) 75 MG tablet Take 1 tablet by mouth daily 30 tablet 0    mupirocin (BACTROBAN) 2 % ointment Apply to each swift twice daily starting on 11/18/2020. 3 g 0    ketoconazole (NIZORAL) 2 % cream Apply topically 2 times daily   3    triamcinolone (KENALOG) 0.1 % cream Apply topically 2 times daily   3    hydrALAZINE (APRESOLINE) 25 MG tablet Take 25 mg by mouth 2 times daily       allopurinol (ZYLOPRIM) 100 MG tablet Take 300 mg by mouth daily       docusate sodium (COLACE) 100 MG capsule Take 100 mg by mouth 4 times daily      furosemide (LASIX) 40 MG tablet Take 40 mg by mouth 2 times daily   3    cyanocobalamin 1000 MCG/ML injection Inject 1,000 mcg into the skin every 14 days   2    DULoxetine (CYMBALTA) 60 MG capsule Take 60 mg by mouth daily   3    ZETIA 10 MG tablet Take 10 mg by mouth daily   3    FREESTYLE LITE strip 1 ITEM DAILY TEST BLOOD SUGAR ONCE DAILY. DX:E11.9  3    levothyroxine (SYNTHROID) 125 MCG tablet Take 125 mcg by mouth Daily   2    metFORMIN (GLUCOPHAGE) 1000 MG tablet Take 1,000 mg by mouth 2 times daily (with meals) 1000mg BID  11    potassium chloride (MICRO-K) 10 MEQ CR capsule Take 10 mEq by mouth daily   3    propranolol (INDERAL) 10 MG tablet Take 20 mg by mouth 2 times daily   0    vitamin E 400 UNIT capsule Take 400 Units by mouth daily      CPAP Machine MISC by Does not apply route       No current facility-administered medications for this visit. Allergies: Latex; Ibuprofen; Asa [aspirin]; Bee venom; Betadine [povidone iodine]; Codeine; Donnatal [phenobarbital-belladonna alk]; Fulvicin-p-g [griseofulvin]; Tape [adhesive tape]; Lortab [hydrocodone-acetaminophen];  Pravastatin; and Wasp venom  Past Medical History:   Diagnosis Date    Acid reflux     Arthritis     CPAP (continuous positive airway pressure) dependence     8cm to 16cm    Diabetes mellitus (HCC)     Heart murmur     Hyperlipidemia     Hypertension  Obstructive sleep apnea     AHI:  20.5; In REM AHI of 56.5 per PSG, 7/2010    Psoriasis     Restless leg syndrome     Thyroid disease      Past Surgical History:   Procedure Laterality Date    BREAST BIOPSY Left     CARDIAC CATHETERIZATION      CATARACT REMOVAL Right     COLONOSCOPY  2004 and 2008    FOOT SURGERY Right     mortons     HYSTERECTOMY      KNEE SURGERY Left     replacement    RECTAL SURGERY      abcess gland    STOMACH SURGERY      tumor removed     Family History   Problem Relation Age of Onset    Other Sister         blood clots    Heart Attack Mother     Heart Disease Mother     Diabetes Mother     High Blood Pressure Mother     Heart Attack Father     Heart Disease Father     Diabetes Father     High Blood Pressure Father     Heart Disease Brother     Diabetes Brother     Heart Attack Brother 39    Breast Cancer Sister      Social History     Tobacco Use    Smoking status: Never Smoker    Smokeless tobacco: Never Used   Substance Use Topics    Alcohol use: No       Old records have been obtained from the referring provider. These records have been reviewed and summarized. Review of Systems    Constitutional - no significant activity change, appetite change, or unexpected weight change. No fever or chills. No diaphoresis or significant fatigue. HENT - no significant rhinorrhea or epistaxis. No tinnitus or significant hearing loss. Eyes - no sudden vision change or amaurosis. Respiratory - no significant shortness of breath, wheezing, or stridor. No apnea, cough, or chest tightness associated with shortness of breath. Cardiovascular - no chest pain, syncope, or significant dizziness. No palpitations or significant leg swelling. No claudication. Gastrointestinal - no abdominal swelling or pain. No blood in stool. No severe constipation, diarrhea, nausea, or vomiting. Genitourinary - No difficulty urinating, dysuria, frequency, or urgency.   No flank pain or hematuria. Musculoskeletal - no back pain, gait disturbance, or myalgia. Skin - no color change, rash, pallor, or new wound. Neurologic - no dizziness, facial asymmetry, or light headedness. No seizures. No, AF,  speech difficulty or lateralizing weakness. Hematologic - no easy bruising or excessive bleeding. Psychiatric - no severe anxiety or nervousness. No confusion. All other review of systems are negative. Physical Exam    There were no vitals taken for this visit. Constitutional - well developed, well nourished. No diaphoresis or acute distress. HENT - head normocephalic. Right external ear canal appears normal.  Left external ear canal appears normal.  Septum appears midline. Eyes - conjunctiva normal.  EOMS normal.  No exudate. No icterus. Neck- ROM appears normal, no tracheal deviation. Cardiovascular - Regular rate and rhythm. Heart sounds are normal.  No murmur, rub, or gallop. Carotid pulses are 2+ to palpation bilaterally without bruit. Extremities - Radial and ulnar pulses are 2+ to palpation bilaterally. No cyanosis, clubbing, or significant edema. No signs atheroembolic event. Pulmonary - effort appears normal.  No respiratory distress. Lungs - Breath sounds normal. No wheezes or rales. GI - Abdomen - soft, non tender, bowel sounds X 4 quadrants. No guarding or rebound tenderness. No distension or palpable mass. Genitourinary - deferred. Musculoskeletal - ROM appears normal.  No significant edema. Neurologic - alert and oriented X 3. Physiologic. Tongue midline. Face symmetric. No lateralizing weakness noted. Skin - warm, dry, and intact. No rash, erythema, or pallor.   Psychiatric - mood, affect, and behavior appear normal.  Judgment and thought processes appear normal.    Risk factors for atherosclerosis of all vascular beds have been reviewed with the patient including:  Family history, tobacco abuse in all forms, elevated cholesterol, hyperlipidemia, and diabetes. Carotid Doppler results:      Impression          There is mixed plaque visualized in the bilateral internal carotid     arteries.    Hallie Jessie is less than 50% stenosis in the right internal carotid artery.     There is 70-99% stenosis of the left internal carotid artery.     There is normal antegrade flow in the bilateral vertebral arteries.          Signature          ----------------------------------------------------------------     Electronically signed by Bonita Thakur MD(Interpreting     physician) on 10/30/2020 12:54 PM     ----------------------------------------------------------------         Blood Pressure:Right arm 184/98 mmHg. Left arm 180/96 mmHg.         Velocities are measured in cm/s ; Diameters are measured in mm         Carotid Right Measurements    +------------+-------+-------+--------+-------+------------+---------------+    ! Location    !PSV    !EDV    ! Angle   !RI     !%Stenosis   ! Tortuosity     !    +------------+-------+-------+--------+-------+------------+---------------+    ! Prox CCA    !99.8   !16.5   !60      !0.83   !            !               !    +------------+-------+-------+--------+-------+------------+---------------+    ! Mid CCA     !127    !18.9   !60      !0.85   !            !               !    +------------+-------+-------+--------+-------+------------+---------------+    ! Prox ICA    !90.4   !21.2   !60      !0.77   !            !               !    +------------+-------+-------+--------+-------+------------+---------------+    ! Mid ICA     !95.9   !22     !60      !0.77   !            !               !    +------------+-------+-------+--------+-------+------------+---------------+    ! Dist ICA    !122    !30.6   !60      !0.75   !            !               !    +------------+-------+-------+--------+-------+------------+---------------+    ! Prox ECA    !90.4   !12.6   !60      !0.86   !            !               ! +------------+-------+-------+--------+-------+------------+---------------+    ! Vertebral   !50.3   !14.9   !60      !0.7    !            !               !    +------------+-------+-------+--------+-------+------------+---------------+           - There is antegrade vertebral flow noted on the right side.           - Additional Measurements:ICAPSV/CCAPSV 1.22. ICAEDV/CCAEDV 1.85.         Carotid Left Measurements    +------------+-------+-------+--------+-------+------------+---------------+    ! Location    !PSV    !EDV    ! Angle   !RI     !%Stenosis   ! Tortuosity     !    +------------+-------+-------+--------+-------+------------+---------------+    ! Prox CCA    !141    !21.2   !60      !0.85   !            !               !    +------------+-------+-------+--------+-------+------------+---------------+    ! Mid CCA     !80.1   !16.5   !60      !0.79   !            !               !    +------------+-------+-------+--------+-------+------------+---------------+    ! Prox ICA    !637    !171    !60      !0.67   !            !               !    +------------+-------+-------+--------+-------+------------+---------------+    ! Mid ICA     !113    !24.6   !60      !0.78   !            !               !    +------------+-------+-------+--------+-------+------------+---------------+    ! Dist ICA    !94.3   !29.5   !60      !0.69   !            !               !    +------------+-------+-------+--------+-------+------------+---------------+    ! Prox ECA    !105    !11     !60      !0.9    !            !               !    +------------+-------+-------+--------+-------+------------+---------------+    ! Vertebral   !75.6   !14.7   !60      !0.81   !            !               !    +------------+-------+-------+--------+-------+------------+---------------+           - There is antegrade vertebral flow noted on the left side.           - Additional Measurements:ICAPSV/CCAPSV 3.65. ICAEDV/CCAEDV 8.07.           Individual velocities reviewed: Yes. Results were reviewed with the patient. Assessment      No diagnosis found. Plan      Recommend initiation of statin therapy  We will obtain a CTA head/neck. After we have reviewed the images we will notify her of further recommendations. We discussed holding her Glucophage for the day of the procedure and 2 days afterwards. I encourage her to really watch her diet over the next few days. We also discussed the need to use contrast for the CT. She had a normal creatinine and GFR 6/2020. Addendum: 11/13/2020  CT called stating the patient's creatinine was 1.6, GFR 32. We will cancel CT and pre treat the patient with IVF's pre and post CT. We will call the patient with the results and further recommendations. CTA head/neck - (reviewed by Dr. Ashley Peterson)  FINDINGS:    ANGIOGRAM:    Typical three-vessel branching pattern off the aortic arch. . There is    no flow-limiting stenosis at the main arch origins. Normal course and    caliber of the common carotid arteries. The minimum luminal diameter at the left internal carotid origin    measures less than 1 mm. Diameter is quite narrow at the origin,    imaging as a string sign. The downstream caliber is measured at 4.7    mm. The minimum luminal diameter of the right internal carotid artery    origin measures 5.2 mm, in comparison to a downstream caliber of 5.1    mm. Posteriorly, the vertebral arteries and visualized basilar artery are    unremarkable. . Right vertebral artery is dominant. Distal carotid arteries are patent. Bilateral anterior and middle    cerebral artery branching appear symmetric. Intracranial vertebral    arteries and basilar artery are normal in caliber. The posterior    cerebral artery branching is symmetric. No large vessel occlusion or    flow-limiting stenosis. OTHER FINDINGS: No acute intracranial process is identified.     Specifically, no midline shift, mass effect, or intracranial

## 2020-11-21 LAB — SARS-COV-2, NAA: NOT DETECTED

## 2020-11-23 ENCOUNTER — ANESTHESIA (OUTPATIENT)
Dept: OPERATING ROOM | Age: 70
DRG: 038 | End: 2020-11-23
Payer: MEDICARE

## 2020-11-23 ENCOUNTER — ANESTHESIA EVENT (OUTPATIENT)
Dept: OPERATING ROOM | Age: 70
DRG: 038 | End: 2020-11-23
Payer: MEDICARE

## 2020-11-23 ENCOUNTER — HOSPITAL ENCOUNTER (INPATIENT)
Age: 70
LOS: 1 days | Discharge: HOME | DRG: 038 | End: 2020-11-24
Attending: SURGERY | Admitting: SURGERY
Payer: MEDICARE

## 2020-11-23 VITALS — RESPIRATION RATE: 5 BRPM | TEMPERATURE: 98.6 F | OXYGEN SATURATION: 91 %

## 2020-11-23 PROBLEM — I65.22 CAROTID ARTERY STENOSIS, SYMPTOMATIC, LEFT: Status: ACTIVE | Noted: 2020-11-23

## 2020-11-23 LAB
ABO/RH: NORMAL
ANTIBODY SCREEN: NORMAL
GLUCOSE BLD-MCNC: 157 MG/DL (ref 70–99)
PERFORMED ON: ABNORMAL

## 2020-11-23 PROCEDURE — 6360000002 HC RX W HCPCS: Performed by: NURSE ANESTHETIST, CERTIFIED REGISTERED

## 2020-11-23 PROCEDURE — 2580000003 HC RX 258: Performed by: SURGERY

## 2020-11-23 PROCEDURE — 93882 EXTRACRANIAL UNI/LTD STUDY: CPT

## 2020-11-23 PROCEDURE — 2580000003 HC RX 258: Performed by: ANESTHESIOLOGY

## 2020-11-23 PROCEDURE — 1210000000 HC MED SURG R&B

## 2020-11-23 PROCEDURE — 3700000001 HC ADD 15 MINUTES (ANESTHESIA): Performed by: SURGERY

## 2020-11-23 PROCEDURE — 86901 BLOOD TYPING SEROLOGIC RH(D): CPT

## 2020-11-23 PROCEDURE — 82947 ASSAY GLUCOSE BLOOD QUANT: CPT

## 2020-11-23 PROCEDURE — 2709999900 HC NON-CHARGEABLE SUPPLY: Performed by: SURGERY

## 2020-11-23 PROCEDURE — 36415 COLL VENOUS BLD VENIPUNCTURE: CPT

## 2020-11-23 PROCEDURE — 3600000015 HC SURGERY LEVEL 5 ADDTL 15MIN: Performed by: SURGERY

## 2020-11-23 PROCEDURE — 6360000002 HC RX W HCPCS: Performed by: SURGERY

## 2020-11-23 PROCEDURE — 6360000002 HC RX W HCPCS: Performed by: PHYSICIAN ASSISTANT

## 2020-11-23 PROCEDURE — 2780000010 HC IMPLANT OTHER: Performed by: SURGERY

## 2020-11-23 PROCEDURE — 86900 BLOOD TYPING SEROLOGIC ABO: CPT

## 2020-11-23 PROCEDURE — 03UL0KZ SUPPLEMENT LEFT INTERNAL CAROTID ARTERY WITH NONAUTOLOGOUS TISSUE SUBSTITUTE, OPEN APPROACH: ICD-10-PCS | Performed by: SURGERY

## 2020-11-23 PROCEDURE — 86850 RBC ANTIBODY SCREEN: CPT

## 2020-11-23 PROCEDURE — 6360000002 HC RX W HCPCS: Performed by: ANESTHESIOLOGY

## 2020-11-23 PROCEDURE — 3700000000 HC ANESTHESIA ATTENDED CARE: Performed by: SURGERY

## 2020-11-23 PROCEDURE — 03CL0ZZ EXTIRPATION OF MATTER FROM LEFT INTERNAL CAROTID ARTERY, OPEN APPROACH: ICD-10-PCS | Performed by: SURGERY

## 2020-11-23 PROCEDURE — C1781 MESH (IMPLANTABLE): HCPCS | Performed by: SURGERY

## 2020-11-23 PROCEDURE — 7100000000 HC PACU RECOVERY - FIRST 15 MIN: Performed by: SURGERY

## 2020-11-23 PROCEDURE — 35301 RECHANNELING OF ARTERY: CPT | Performed by: SURGERY

## 2020-11-23 PROCEDURE — 3600000005 HC SURGERY LEVEL 5 BASE: Performed by: SURGERY

## 2020-11-23 PROCEDURE — 2500000003 HC RX 250 WO HCPCS: Performed by: SURGERY

## 2020-11-23 PROCEDURE — 6370000000 HC RX 637 (ALT 250 FOR IP): Performed by: SURGERY

## 2020-11-23 PROCEDURE — 7100000001 HC PACU RECOVERY - ADDTL 15 MIN: Performed by: SURGERY

## 2020-11-23 PROCEDURE — 2500000003 HC RX 250 WO HCPCS: Performed by: NURSE ANESTHETIST, CERTIFIED REGISTERED

## 2020-11-23 PROCEDURE — 2700000000 HC OXYGEN THERAPY PER DAY

## 2020-11-23 DEVICE — XENOSURE BIOLOGIC PATCH, 1CM X 6CM
Type: IMPLANTABLE DEVICE | Status: FUNCTIONAL
Brand: XENOSURE BIOLOGIC PATCH

## 2020-11-23 RX ORDER — EPHEDRINE SULFATE 50 MG/ML
INJECTION, SOLUTION INTRAVENOUS PRN
Status: DISCONTINUED | OUTPATIENT
Start: 2020-11-23 | End: 2020-11-23 | Stop reason: SDUPTHER

## 2020-11-23 RX ORDER — SODIUM CHLORIDE, SODIUM LACTATE, POTASSIUM CHLORIDE, CALCIUM CHLORIDE 600; 310; 30; 20 MG/100ML; MG/100ML; MG/100ML; MG/100ML
INJECTION, SOLUTION INTRAVENOUS CONTINUOUS
Status: DISCONTINUED | OUTPATIENT
Start: 2020-11-23 | End: 2020-11-23

## 2020-11-23 RX ORDER — LABETALOL HYDROCHLORIDE 5 MG/ML
5 INJECTION, SOLUTION INTRAVENOUS EVERY 10 MIN PRN
Status: DISCONTINUED | OUTPATIENT
Start: 2020-11-23 | End: 2020-11-23 | Stop reason: HOSPADM

## 2020-11-23 RX ORDER — RIFAMPIN 600 MG/10ML
INJECTION, POWDER, LYOPHILIZED, FOR SOLUTION INTRAVENOUS PRN
Status: DISCONTINUED | OUTPATIENT
Start: 2020-11-23 | End: 2020-11-23 | Stop reason: HOSPADM

## 2020-11-23 RX ORDER — FENTANYL CITRATE 50 UG/ML
INJECTION, SOLUTION INTRAMUSCULAR; INTRAVENOUS PRN
Status: DISCONTINUED | OUTPATIENT
Start: 2020-11-23 | End: 2020-11-23 | Stop reason: SDUPTHER

## 2020-11-23 RX ORDER — ACETAMINOPHEN 325 MG/1
650 TABLET ORAL EVERY 4 HOURS PRN
Status: DISCONTINUED | OUTPATIENT
Start: 2020-11-23 | End: 2020-11-24 | Stop reason: HOSPADM

## 2020-11-23 RX ORDER — LEVOTHYROXINE SODIUM 0.12 MG/1
125 TABLET ORAL DAILY
Status: DISCONTINUED | OUTPATIENT
Start: 2020-11-23 | End: 2020-11-24 | Stop reason: HOSPADM

## 2020-11-23 RX ORDER — MIDAZOLAM HYDROCHLORIDE 1 MG/ML
2 INJECTION INTRAMUSCULAR; INTRAVENOUS
Status: COMPLETED | OUTPATIENT
Start: 2020-11-23 | End: 2020-11-23

## 2020-11-23 RX ORDER — ROCURONIUM BROMIDE 10 MG/ML
INJECTION, SOLUTION INTRAVENOUS PRN
Status: DISCONTINUED | OUTPATIENT
Start: 2020-11-23 | End: 2020-11-23 | Stop reason: SDUPTHER

## 2020-11-23 RX ORDER — LIDOCAINE HYDROCHLORIDE 40 MG/ML
SOLUTION TOPICAL PRN
Status: DISCONTINUED | OUTPATIENT
Start: 2020-11-23 | End: 2020-11-23 | Stop reason: SDUPTHER

## 2020-11-23 RX ORDER — DEXAMETHASONE SODIUM PHOSPHATE 10 MG/ML
INJECTION, SOLUTION INTRAMUSCULAR; INTRAVENOUS PRN
Status: DISCONTINUED | OUTPATIENT
Start: 2020-11-23 | End: 2020-11-23 | Stop reason: SDUPTHER

## 2020-11-23 RX ORDER — SODIUM CHLORIDE 0.9 % (FLUSH) 0.9 %
10 SYRINGE (ML) INJECTION EVERY 12 HOURS SCHEDULED
Status: DISCONTINUED | OUTPATIENT
Start: 2020-11-23 | End: 2020-11-23 | Stop reason: HOSPADM

## 2020-11-23 RX ORDER — MORPHINE SULFATE 4 MG/ML
2 INJECTION, SOLUTION INTRAMUSCULAR; INTRAVENOUS EVERY 5 MIN PRN
Status: DISCONTINUED | OUTPATIENT
Start: 2020-11-23 | End: 2020-11-23 | Stop reason: HOSPADM

## 2020-11-23 RX ORDER — HYDRALAZINE HYDROCHLORIDE 25 MG/1
25 TABLET, FILM COATED ORAL 2 TIMES DAILY
Status: DISCONTINUED | OUTPATIENT
Start: 2020-11-23 | End: 2020-11-24 | Stop reason: HOSPADM

## 2020-11-23 RX ORDER — LIDOCAINE HYDROCHLORIDE 10 MG/ML
1 INJECTION, SOLUTION EPIDURAL; INFILTRATION; INTRACAUDAL; PERINEURAL
Status: DISCONTINUED | OUTPATIENT
Start: 2020-11-23 | End: 2020-11-23 | Stop reason: HOSPADM

## 2020-11-23 RX ORDER — CLOPIDOGREL BISULFATE 75 MG/1
75 TABLET ORAL DAILY
Status: DISCONTINUED | OUTPATIENT
Start: 2020-11-24 | End: 2020-11-23 | Stop reason: SDUPTHER

## 2020-11-23 RX ORDER — SODIUM CHLORIDE 0.9 % (FLUSH) 0.9 %
10 SYRINGE (ML) INJECTION EVERY 12 HOURS SCHEDULED
Status: DISCONTINUED | OUTPATIENT
Start: 2020-11-23 | End: 2020-11-24 | Stop reason: HOSPADM

## 2020-11-23 RX ORDER — ALLOPURINOL 300 MG/1
300 TABLET ORAL DAILY
Status: DISCONTINUED | OUTPATIENT
Start: 2020-11-23 | End: 2020-11-24 | Stop reason: HOSPADM

## 2020-11-23 RX ORDER — FAMOTIDINE 20 MG/1
20 TABLET, FILM COATED ORAL DAILY
Status: DISCONTINUED | OUTPATIENT
Start: 2020-11-23 | End: 2020-11-24 | Stop reason: HOSPADM

## 2020-11-23 RX ORDER — MEPERIDINE HYDROCHLORIDE 50 MG/ML
12.5 INJECTION INTRAMUSCULAR; INTRAVENOUS; SUBCUTANEOUS EVERY 5 MIN PRN
Status: DISCONTINUED | OUTPATIENT
Start: 2020-11-23 | End: 2020-11-23 | Stop reason: HOSPADM

## 2020-11-23 RX ORDER — SODIUM CHLORIDE 0.9 % (FLUSH) 0.9 %
10 SYRINGE (ML) INJECTION PRN
Status: DISCONTINUED | OUTPATIENT
Start: 2020-11-23 | End: 2020-11-23 | Stop reason: HOSPADM

## 2020-11-23 RX ORDER — GLYCOPYRROLATE 0.2 MG/ML
INJECTION INTRAMUSCULAR; INTRAVENOUS PRN
Status: DISCONTINUED | OUTPATIENT
Start: 2020-11-23 | End: 2020-11-23 | Stop reason: SDUPTHER

## 2020-11-23 RX ORDER — MORPHINE SULFATE 4 MG/ML
4 INJECTION, SOLUTION INTRAMUSCULAR; INTRAVENOUS EVERY 5 MIN PRN
Status: DISCONTINUED | OUTPATIENT
Start: 2020-11-23 | End: 2020-11-23 | Stop reason: HOSPADM

## 2020-11-23 RX ORDER — PROPRANOLOL HYDROCHLORIDE 20 MG/1
20 TABLET ORAL 2 TIMES DAILY
Status: DISCONTINUED | OUTPATIENT
Start: 2020-11-23 | End: 2020-11-24 | Stop reason: HOSPADM

## 2020-11-23 RX ORDER — PROPOFOL 10 MG/ML
INJECTION, EMULSION INTRAVENOUS PRN
Status: DISCONTINUED | OUTPATIENT
Start: 2020-11-23 | End: 2020-11-23 | Stop reason: SDUPTHER

## 2020-11-23 RX ORDER — LIDOCAINE HYDROCHLORIDE 10 MG/ML
INJECTION, SOLUTION EPIDURAL; INFILTRATION; INTRACAUDAL; PERINEURAL PRN
Status: DISCONTINUED | OUTPATIENT
Start: 2020-11-23 | End: 2020-11-23 | Stop reason: HOSPADM

## 2020-11-23 RX ORDER — PROTAMINE SULFATE 10 MG/ML
INJECTION, SOLUTION INTRAVENOUS PRN
Status: DISCONTINUED | OUTPATIENT
Start: 2020-11-23 | End: 2020-11-23 | Stop reason: SDUPTHER

## 2020-11-23 RX ORDER — METOCLOPRAMIDE HYDROCHLORIDE 5 MG/ML
10 INJECTION INTRAMUSCULAR; INTRAVENOUS
Status: DISCONTINUED | OUTPATIENT
Start: 2020-11-23 | End: 2020-11-23 | Stop reason: HOSPADM

## 2020-11-23 RX ORDER — FENTANYL CITRATE 50 UG/ML
50 INJECTION, SOLUTION INTRAMUSCULAR; INTRAVENOUS
Status: DISCONTINUED | OUTPATIENT
Start: 2020-11-23 | End: 2020-11-23 | Stop reason: HOSPADM

## 2020-11-23 RX ORDER — DULOXETIN HYDROCHLORIDE 60 MG/1
60 CAPSULE, DELAYED RELEASE ORAL DAILY
Status: DISCONTINUED | OUTPATIENT
Start: 2020-11-23 | End: 2020-11-24 | Stop reason: HOSPADM

## 2020-11-23 RX ORDER — HYDRALAZINE HYDROCHLORIDE 20 MG/ML
5 INJECTION INTRAMUSCULAR; INTRAVENOUS EVERY 10 MIN PRN
Status: DISCONTINUED | OUTPATIENT
Start: 2020-11-23 | End: 2020-11-23 | Stop reason: HOSPADM

## 2020-11-23 RX ORDER — HYDROMORPHONE HYDROCHLORIDE 1 MG/ML
0.25 INJECTION, SOLUTION INTRAMUSCULAR; INTRAVENOUS; SUBCUTANEOUS EVERY 5 MIN PRN
Status: DISCONTINUED | OUTPATIENT
Start: 2020-11-23 | End: 2020-11-23 | Stop reason: HOSPADM

## 2020-11-23 RX ORDER — DIPHENHYDRAMINE HYDROCHLORIDE 50 MG/ML
12.5 INJECTION INTRAMUSCULAR; INTRAVENOUS
Status: DISCONTINUED | OUTPATIENT
Start: 2020-11-23 | End: 2020-11-23 | Stop reason: HOSPADM

## 2020-11-23 RX ORDER — CALCIUM CHLORIDE 100 MG/ML
INJECTION INTRAVENOUS; INTRAVENTRICULAR PRN
Status: DISCONTINUED | OUTPATIENT
Start: 2020-11-23 | End: 2020-11-23 | Stop reason: SDUPTHER

## 2020-11-23 RX ORDER — VERAPAMIL HYDROCHLORIDE 120 MG/1
240 CAPSULE, EXTENDED RELEASE ORAL NIGHTLY
Status: DISCONTINUED | OUTPATIENT
Start: 2020-11-24 | End: 2020-11-23 | Stop reason: CLARIF

## 2020-11-23 RX ORDER — TRAMADOL HYDROCHLORIDE 50 MG/1
50 TABLET ORAL EVERY 4 HOURS PRN
Status: DISCONTINUED | OUTPATIENT
Start: 2020-11-23 | End: 2020-11-24 | Stop reason: HOSPADM

## 2020-11-23 RX ORDER — PROMETHAZINE HYDROCHLORIDE 25 MG/ML
6.25 INJECTION, SOLUTION INTRAMUSCULAR; INTRAVENOUS
Status: DISCONTINUED | OUTPATIENT
Start: 2020-11-23 | End: 2020-11-23 | Stop reason: HOSPADM

## 2020-11-23 RX ORDER — LIDOCAINE HYDROCHLORIDE 10 MG/ML
INJECTION, SOLUTION EPIDURAL; INFILTRATION; INTRACAUDAL; PERINEURAL PRN
Status: DISCONTINUED | OUTPATIENT
Start: 2020-11-23 | End: 2020-11-23 | Stop reason: SDUPTHER

## 2020-11-23 RX ORDER — HYDROMORPHONE HYDROCHLORIDE 1 MG/ML
0.5 INJECTION, SOLUTION INTRAMUSCULAR; INTRAVENOUS; SUBCUTANEOUS EVERY 5 MIN PRN
Status: DISCONTINUED | OUTPATIENT
Start: 2020-11-23 | End: 2020-11-23 | Stop reason: HOSPADM

## 2020-11-23 RX ORDER — HEPARIN SODIUM 1000 [USP'U]/ML
INJECTION, SOLUTION INTRAVENOUS; SUBCUTANEOUS PRN
Status: DISCONTINUED | OUTPATIENT
Start: 2020-11-23 | End: 2020-11-23 | Stop reason: SDUPTHER

## 2020-11-23 RX ORDER — MORPHINE SULFATE 4 MG/ML
4 INJECTION, SOLUTION INTRAMUSCULAR; INTRAVENOUS
Status: DISCONTINUED | OUTPATIENT
Start: 2020-11-23 | End: 2020-11-23 | Stop reason: HOSPADM

## 2020-11-23 RX ORDER — CLOPIDOGREL BISULFATE 75 MG/1
75 TABLET ORAL DAILY
Status: DISCONTINUED | OUTPATIENT
Start: 2020-11-23 | End: 2020-11-24 | Stop reason: HOSPADM

## 2020-11-23 RX ORDER — ONDANSETRON 2 MG/ML
INJECTION INTRAMUSCULAR; INTRAVENOUS PRN
Status: DISCONTINUED | OUTPATIENT
Start: 2020-11-23 | End: 2020-11-23 | Stop reason: SDUPTHER

## 2020-11-23 RX ORDER — SODIUM CHLORIDE 0.9 % (FLUSH) 0.9 %
10 SYRINGE (ML) INJECTION PRN
Status: DISCONTINUED | OUTPATIENT
Start: 2020-11-23 | End: 2020-11-24 | Stop reason: HOSPADM

## 2020-11-23 RX ORDER — 0.9 % SODIUM CHLORIDE 0.9 %
500 INTRAVENOUS SOLUTION INTRAVENOUS ONCE
Status: COMPLETED | OUTPATIENT
Start: 2020-11-23 | End: 2020-11-23

## 2020-11-23 RX ORDER — VERAPAMIL HYDROCHLORIDE 240 MG/1
240 TABLET, FILM COATED, EXTENDED RELEASE ORAL NIGHTLY
Status: DISCONTINUED | OUTPATIENT
Start: 2020-11-23 | End: 2020-11-24 | Stop reason: HOSPADM

## 2020-11-23 RX ORDER — LISINOPRIL 20 MG/1
20 TABLET ORAL DAILY
Status: DISCONTINUED | OUTPATIENT
Start: 2020-11-23 | End: 2020-11-24 | Stop reason: HOSPADM

## 2020-11-23 RX ADMIN — LIDOCAINE HYDROCHLORIDE 50 MG: 10 INJECTION, SOLUTION EPIDURAL; INFILTRATION; INTRACAUDAL; PERINEURAL at 07:41

## 2020-11-23 RX ADMIN — EPHEDRINE SULFATE 30 MG: 50 INJECTION INTRAMUSCULAR; INTRAVENOUS; SUBCUTANEOUS at 07:48

## 2020-11-23 RX ADMIN — Medication 2 G: at 07:50

## 2020-11-23 RX ADMIN — TRAMADOL HYDROCHLORIDE 50 MG: 50 TABLET, FILM COATED ORAL at 12:54

## 2020-11-23 RX ADMIN — DULOXETINE 60 MG: 60 CAPSULE, DELAYED RELEASE ORAL at 12:54

## 2020-11-23 RX ADMIN — CALCIUM CHLORIDE 0.5 G: 100 INJECTION, SOLUTION INTRAVENOUS; INTRAVENTRICULAR at 08:04

## 2020-11-23 RX ADMIN — MIDAZOLAM 2 MG: 1 INJECTION INTRAMUSCULAR; INTRAVENOUS at 07:12

## 2020-11-23 RX ADMIN — ACETAMINOPHEN 650 MG: 325 TABLET ORAL at 16:42

## 2020-11-23 RX ADMIN — FENTANYL CITRATE 50 MCG: 50 INJECTION INTRAMUSCULAR; INTRAVENOUS at 08:15

## 2020-11-23 RX ADMIN — DEXAMETHASONE SODIUM PHOSPHATE 10 MG: 10 INJECTION, SOLUTION INTRAMUSCULAR; INTRAVENOUS at 08:04

## 2020-11-23 RX ADMIN — SODIUM CHLORIDE 500 ML: 9 INJECTION, SOLUTION INTRAVENOUS at 15:14

## 2020-11-23 RX ADMIN — VERAPAMIL HYDROCHLORIDE 240 MG: 240 TABLET, FILM COATED, EXTENDED RELEASE ORAL at 21:12

## 2020-11-23 RX ADMIN — ENOXAPARIN SODIUM 40 MG: 40 INJECTION SUBCUTANEOUS at 12:55

## 2020-11-23 RX ADMIN — ROCURONIUM BROMIDE 20 MG: 10 INJECTION, SOLUTION INTRAVENOUS at 09:36

## 2020-11-23 RX ADMIN — METFORMIN HYDROCHLORIDE 1000 MG: 500 TABLET ORAL at 16:42

## 2020-11-23 RX ADMIN — SODIUM CHLORIDE, SODIUM LACTATE, POTASSIUM CHLORIDE, AND CALCIUM CHLORIDE: 600; 310; 30; 20 INJECTION, SOLUTION INTRAVENOUS at 08:17

## 2020-11-23 RX ADMIN — ONDANSETRON HYDROCHLORIDE 4 MG: 2 INJECTION, SOLUTION INTRAMUSCULAR; INTRAVENOUS at 09:55

## 2020-11-23 RX ADMIN — ALLOPURINOL 300 MG: 300 TABLET ORAL at 12:54

## 2020-11-23 RX ADMIN — HEPARIN SODIUM 7000 UNITS: 1000 INJECTION, SOLUTION INTRAVENOUS; SUBCUTANEOUS at 08:38

## 2020-11-23 RX ADMIN — EPHEDRINE SULFATE 20 MG: 50 INJECTION INTRAMUSCULAR; INTRAVENOUS; SUBCUTANEOUS at 07:50

## 2020-11-23 RX ADMIN — PROPOFOL 150 MG: 10 INJECTION, EMULSION INTRAVENOUS at 07:41

## 2020-11-23 RX ADMIN — FAMOTIDINE 20 MG: 20 TABLET ORAL at 12:54

## 2020-11-23 RX ADMIN — CLOPIDOGREL BISULFATE 75 MG: 75 TABLET ORAL at 12:54

## 2020-11-23 RX ADMIN — TRAMADOL HYDROCHLORIDE 50 MG: 50 TABLET, FILM COATED ORAL at 21:13

## 2020-11-23 RX ADMIN — SODIUM CHLORIDE, SODIUM LACTATE, POTASSIUM CHLORIDE, AND CALCIUM CHLORIDE: 600; 310; 30; 20 INJECTION, SOLUTION INTRAVENOUS at 06:58

## 2020-11-23 RX ADMIN — LIDOCAINE HYDROCHLORIDE 4 ML: 40 SOLUTION TOPICAL at 07:44

## 2020-11-23 RX ADMIN — FENTANYL CITRATE 100 MCG: 50 INJECTION INTRAMUSCULAR; INTRAVENOUS at 07:41

## 2020-11-23 RX ADMIN — ROCURONIUM BROMIDE 30 MG: 10 INJECTION, SOLUTION INTRAVENOUS at 09:24

## 2020-11-23 RX ADMIN — CALCIUM CHLORIDE 0.5 G: 100 INJECTION, SOLUTION INTRAVENOUS; INTRAVENTRICULAR at 07:57

## 2020-11-23 RX ADMIN — GLYCOPYRROLATE 0.4 MG: 0.2 INJECTION, SOLUTION INTRAMUSCULAR; INTRAVENOUS at 08:23

## 2020-11-23 RX ADMIN — ROCURONIUM BROMIDE 50 MG: 10 INJECTION, SOLUTION INTRAVENOUS at 07:41

## 2020-11-23 RX ADMIN — PROTAMINE SULFATE 25 MG: 10 INJECTION, SOLUTION INTRAVENOUS at 09:42

## 2020-11-23 RX ADMIN — LEVOTHYROXINE SODIUM 125 MCG: 125 TABLET ORAL at 12:54

## 2020-11-23 RX ADMIN — EPHEDRINE SULFATE 50 MG: 50 INJECTION INTRAMUSCULAR; INTRAVENOUS; SUBCUTANEOUS at 07:55

## 2020-11-23 RX ADMIN — SUGAMMADEX 300 MG: 100 INJECTION, SOLUTION INTRAVENOUS at 09:55

## 2020-11-23 ASSESSMENT — ENCOUNTER SYMPTOMS: SHORTNESS OF BREATH: 0

## 2020-11-23 ASSESSMENT — PAIN SCALES - GENERAL
PAINLEVEL_OUTOF10: 3
PAINLEVEL_OUTOF10: 6
PAINLEVEL_OUTOF10: 4
PAINLEVEL_OUTOF10: 3

## 2020-11-23 ASSESSMENT — PAIN DESCRIPTION - PAIN TYPE: TYPE: SURGICAL PAIN

## 2020-11-23 ASSESSMENT — LIFESTYLE VARIABLES: SMOKING_STATUS: 0

## 2020-11-23 ASSESSMENT — PAIN DESCRIPTION - ORIENTATION: ORIENTATION: LEFT

## 2020-11-23 ASSESSMENT — PAIN - FUNCTIONAL ASSESSMENT: PAIN_FUNCTIONAL_ASSESSMENT: 0-10

## 2020-11-23 ASSESSMENT — PAIN DESCRIPTION - LOCATION: LOCATION: NECK

## 2020-11-23 NOTE — OP NOTE
vagus nerves were carefully identified and protected throughout this dissection. After adequate exposure and heparinization time, the distal internal, common and external carotid arteries are clamped. A longitudinal arteriotomy made in the common carotid artery with 11 blade and carried proximally with Suarez scissors and distally along the course of the internal carotid artery and beyond the plaque. [place] shunt was placed in the internal and then the common carotid artery. Flow through shunt confirmed with doppler. [place] elevator was used to remove plaque from the common carotid artery and the plaque was cut flush with the proximal arteriotomy with Suarez scissors. An eversion endarterectomy was performed of the external carotid artery with excellent back bleeding. The plaque was removed from the internal carotid artery. Carotid lumen was flushed with heparinized saline, debris were removed. The distal intima was tacked using 7-0 Prolene. After all the debris was removed, the area was again irrigated with heparinized saline and then a bovine pericardial patch  was performed with 6-0 Prolene running suture. Prior to competing this patch repair, the  shunt was removed and standard flushing techniques were used to remove any air and debris from the native vessels and flow was restored to the left brain. Completion ultrasound was done and showed no intimal flaps, no residual stenosis. Platysma was closed with 3-0 vicryl running suture. Skin was closed with 4-0 monocryl subcuticular sutures , steri strips and sterile dressing placed. The patient tolerated the procedure well. She was extubated, following all commands in the operating room and moving all extremities. She was brought to the recovery room in good condition.             Electronically signed by Chava Prakash DO on 11/23/2020 at 10:22 AM

## 2020-11-23 NOTE — ANESTHESIA PROCEDURE NOTES
Arterial Line:    An arterial line was placed using ultrasound guidance and surface landmarks, in the pre-op for the following indication(s): continuous blood pressure monitoring and blood sampling needed. A 22 gauge (size), 1 and 3/4 inch (length), Arrow (type) catheter was placed, Seldinger technique used, into the left radial artery and a Pirq Arterial Cannulation Support device was used for positioning, secured by tape and Tegaderm. Anesthesia type: Local    Events:  patient tolerated procedure well with no complications.   11/23/2020 7:26 95/66/2016 7:26 AM  Anesthesiologist: Faye Abdullahi MD  Performed: Anesthesiologist   Preanesthetic Checklist  Completed: patient identified, site marked, surgical consent, pre-op evaluation, timeout performed, IV checked, risks and benefits discussed, monitors and equipment checked, anesthesia consent given, oxygen available and patient being monitored

## 2020-11-23 NOTE — INTERVAL H&P NOTE
Update History & Physical    The patient's History and Physical of November 20, 2020 was reviewed with the patient and I examined the patient. There was no change. The surgical site was confirmed by the patient and me. Plan: The risks, benefits, expected outcome, and alternative to the recommended procedure have been discussed with the patient. Patient understands and wants to proceed with the procedure.      Electronically signed by Anushka Oneal DO on 11/23/2020 at 7:21 AM

## 2020-11-23 NOTE — BRIEF OP NOTE
Brief Postoperative Note      Patient: Valentín Hartley  YOB: 1950  MRN: [id number]    Date of Procedure: 11/23/2020    Pre-Op Diagnosis: I65.22, I65.23    Post-Op Diagnosis: Same       Procedure(s):  LEFT CAROTID ENDARTERECTOMY    Surgeon(s):  Naomi Lane DO    Assistant:  * No surgical staff found *    Anesthesia: General    Estimated Blood Loss (mL): less than 795     Complications: None    Specimens:   * No specimens in log *    Implants:  Implant Name Type Inv.  Item Serial No.  Lot No. LRB No. Used Action   PATCH VASC Vernette Hurter [id number] Bone/Graft/Tissue/Human/Synth PATCH VASC XENOSURE [id number]  [facility] [id number] 1 Implanted         Drains: * No LDAs found *    Findings: no intimal flaps, no elevated velocity, high grade stenosis with small residual lumen    Electronically signed by Naomi Lane DO on 11/23/2020 at 10:21 AM

## 2020-11-23 NOTE — ANESTHESIA PRE PROCEDURE
Department of Anesthesiology  Preprocedure Note       Name:  Robyn Chiadez   Age:  69 y.o.  :  1950                                          MRN:  [id number]         Date:  2020      Surgeon: Bryce Rose):  Lance Patel DO    Procedure: Procedure(s):  LEFT CAROTID ENDARTERECTOMY    Medications prior to admission:   Prior to Admission medications    Medication Sig Start Date End Date Taking?  Authorizing Provider   verapamil (VERELAN) 240 MG extended release capsule Take 240 mg by mouth nightly   Yes Historical Provider, MD   lisinopril (PRINIVIL;ZESTRIL) 20 MG tablet Take 20 mg by mouth daily   Yes Historical Provider, MD   famotidine (PEPCID) 20 MG tablet Take 20 mg by mouth daily   Yes Historical Provider, MD   mupirocin (BACTROBAN) 2 % ointment Apply to each swift twice daily starting on 2020. 20 Yes Martha Patel PA-C   hydrALAZINE (APRESOLINE) 25 MG tablet Take 25 mg by mouth 2 times daily  18  Yes Historical Provider, MD   allopurinol (ZYLOPRIM) 100 MG tablet Take 300 mg by mouth daily  17  Yes Historical Provider, MD   docusate sodium (COLACE) 100 MG capsule Take 100 mg by mouth 4 times daily   Yes Historical Provider, MD   furosemide (LASIX) 40 MG tablet Take 40 mg by mouth 2 times daily  17  Yes Historical Provider, MD   DULoxetine (CYMBALTA) 60 MG capsule Take 60 mg by mouth daily  16  Yes Historical Provider, MD   ZETIA 10 MG tablet Take 10 mg by mouth daily  16  Yes Historical Provider, MD   levothyroxine (SYNTHROID) 125 MCG tablet Take 125 mcg by mouth Daily  16  Yes Historical Provider, MD   metFORMIN (GLUCOPHAGE) 1000 MG tablet Take 1,000 mg by mouth 2 times daily (with meals) 1000mg BID 16  Yes Historical Provider, MD   potassium chloride (MICRO-K) 10 MEQ CR capsule Take 10 mEq by mouth daily  16  Yes Historical Provider, MD   propranolol (INDERAL) 10 MG tablet Take 20 mg by mouth 2 times daily  16  Yes Historical Provider, MD   vitamin E 400 UNIT capsule Take 400 Units by mouth daily   Yes Historical Provider, MD   senna-docusate (PERICOLACE) 8.6-50 MG per tablet Take 2 tablets by mouth daily    Historical Provider, MD   fluocinonide (LIDEX) 0.05 % cream Apply topically 2 times daily Apply topically 2 times daily. Historical Provider, MD   clopidogrel (PLAVIX) 75 MG tablet Take 1 tablet by mouth daily 11/17/20   Martha Patel PA-C   ketoconazole (NIZORAL) 2 % cream Apply topically 2 times daily  8/12/19   Historical Provider, MD   triamcinolone (KENALOG) 0.1 % cream Apply topically 2 times daily  8/12/19   Historical Provider, MD   cyanocobalamin 1000 MCG/ML injection Inject 1,000 mcg into the skin every 14 days  6/18/16   Historical Provider, MD   FREESTYLE LITE strip 1 ITEM DAILY TEST BLOOD SUGAR ONCE DAILY. DX:E11.9 4/15/16   Historical Provider, MD   CPAP Machine MISC by Does not apply route    Historical Provider, MD       Current medications:    Current Facility-Administered Medications   Medication Dose Route Frequency Provider Last Rate Last Dose    ceFAZolin (ANCEF) 2 g in 0.9% sodium chloride 50 mL IVPB  2 g Intravenous On Call to [facility] [address] Patel        sodium chloride flush 0.9 % injection 10 mL  10 mL Intravenous 2 [facility]        sodium chloride flush 0.9 % injection 10 mL  10 mL Intravenous PRN Martha Patel PA-C        lactated ringers infusion   Intravenous Continuous Adela Humbles, DO           Allergies:     Allergies   Allergen Reactions    Latex     Ibuprofen     Asa [Aspirin]     Bee Venom     Betadine [Povidone Iodine]     Codeine     Donnatal [Phenobarbital-Belladonna Alk]     Fulvicin-P-G [Griseofulvin]     Tape [employer] Tape] Rash    Lortab [Hydrocodone-Acetaminophen]      Racing heart rate    Pravastatin      TORE UP MY MUSCLES AND MY NERVES    Wasp Venom        Problem List:    Patient Active Problem List   Diagnosis Code    Obstructive sleep apnea G47.33    Restless leg syndrome G25.81    CPAP (continuous positive airway pressure) dependence Z99.89       Past Medical History:        Diagnosis Date    Acid reflux     Arthritis     CPAP (continuous positive airway pressure) dependence     8cm to 16cm    Diabetes mellitus (HCC)     Heart murmur     Hyperlipidemia     Hypertension     Obstructive sleep apnea     AHI:  20.5; In REM AHI of 56.5 per PSG, 7/2010    Psoriasis     Restless leg syndrome     Thyroid disease        Past Surgical History:        Procedure Laterality Date    BREAST BIOPSY Left     CARDIAC CATHETERIZATION      CATARACT REMOVAL Right     COLONOSCOPY  2004 and 2008    FOOT SURGERY Right     mortons     HYSTERECTOMY      KNEE SURGERY Left     replacement    RECTAL SURGERY      abcess gland    STOMACH SURGERY      tumor removed       Social History:    Social History     Tobacco Use    Smoking status: Never Smoker    Smokeless tobacco: Never Used   Substance Use Topics    Alcohol use: No                                Counseling given: Not Answered      Vital Signs (Current):   Vitals:    11/23/20 0637   BP: (!) 178/63   Pulse: 82   Resp: 20   Temp: 96.8 °F (36 °C)   TempSrc: Temporal   Weight: 270 lb (122.5 kg)   Height: 5' 2\" (1.575 m)                                              BP Readings from Last 3 Encounters:   11/23/20 (!) 178/63   11/12/20 (!) 164/63   10/02/20 121/60       NPO Status:                                                                                 BMI:   Wt Readings from Last 3 Encounters:   11/23/20 270 lb (122.5 kg)   11/19/20 270 lb (122.5 kg)   11/12/20 272 lb (123.4 kg)     Body mass index is 49.38 kg/m².     CBC:   Lab Results   Component Value Date    WBC 9.1 11/19/2020    RBC 3.69 11/19/2020    HGB 10.1 11/19/2020    HGB 12.4 05/03/2011    HCT 33.2 11/19/2020    HCT 30.8 06/12/2011    MCV 90.0 11/19/2020    RDW 15.7 11/19/2020     11/19/2020     06/12/2011       CMP:   Lab Results   Component Value Date     11/19/2020     06/13/2011    K 4.3 11/19/2020    K 4.2 06/13/2011     11/19/2020    CL 98 06/13/2011    CO2 30 11/19/2020    BUN 21 11/19/2020    CREATININE 1.3 11/19/2020    CREATININE 0.7 06/13/2011    GFRAA 49 11/19/2020    LABGLOM 40 11/19/2020    GLUCOSE 174 11/19/2020    PROT 5.3 06/12/2011    CALCIUM 9.4 11/19/2020    ALKPHOS 90 06/12/2011    AST 22 06/12/2011    ALT 20 06/12/2011       POC Tests:   Recent Labs     11/23/20  0624   POCGLU 157*       Coags:   Lab Results   Component Value Date    PROTIME 13.8 11/19/2020    PROTIME 13.32 06/11/2011    INR 1.07 11/19/2020    APTT 30.6 11/19/2020       HCG (If Applicable): No results found for: PREGTESTUR, PREGSERUM, HCG, HCGQUANT     ABGs: No results found for: PHART, PO2ART, WEG5JGX, USG7IMZ, BEART, L7ENITWU     Type & Screen (If Applicable):  No results found for: LABABO, LABRH    Drug/Infectious Status (If Applicable):  No results found for: HIV, HEPCAB    COVID-19 Screening (If Applicable):   Lab Results   Component Value Date    COVID19 NOT DETECTED 11/19/2020         Anesthesia Evaluation  Patient summary reviewed and Nursing notes reviewed no history of anesthetic complications:   Airway: Mallampati: II  TM distance: >3 FB   Neck ROM: full  Mouth opening: > = 3 FB Dental: normal exam         Pulmonary:Negative Pulmonary ROS and normal exam  breath sounds clear to auscultation  (+) sleep apnea: on CPAP,      (-) shortness of breath and not a current smoker          Patient did not smoke on day of surgery.                  Cardiovascular:    (+) hypertension: moderate, murmur, hyperlipidemia    (-) CAD,  angina and  CHF    NYHA Classification: I  ECG reviewed  Rhythm: regular  Rate: normal           Beta Blocker:  Not on Beta Blocker         Neuro/Psych:   Negative Neuro/Psych ROS     (-) seizures, CVA and depression/anxiety             ROS comment: RLS GI/Hepatic/Renal: Neg GI/Hepatic/Renal ROS  (+) GERD:, morbid obesity     (-) hiatal hernia       Endo/Other: Negative Endo/Other ROS   (+) DiabetesType II DM, , .          Pt had PAT visit. Abdominal:   (+) obese,     Abdomen: soft. Vascular:   + PVD, aortic or cerebral, . Anesthesia Plan      general     ASA 3     (Iv zofran within 30 min of closing )  Induction: intravenous. arterial line  MIPS: Postoperative opioids intended and Prophylactic antiemetics administered. Anesthetic plan and risks discussed with patient. Use of blood products discussed with patient whom. Plan discussed with CRNA.     Attending anesthesiologist reviewed and agrees with Pre Eval content              Negar Brown MD   11/23/2020

## 2020-11-23 NOTE — ANESTHESIA POSTPROCEDURE EVALUATION
Department of Anesthesiology  Postprocedure Note    Patient: Jerardo Rivera  MRN: [id number]  YOB: 1950  Date of evaluation: 11/23/2020  Time:  10:19 AM     Procedure Summary     Date:  11/23/20 Room / Location:  [address]    Anesthesia Start:  9806 Anesthesia Stop:      Procedure:  LEFT CAROTID ENDARTERECTOMY (Left Neck) Diagnosis:  (I65.22, I65.23)    Surgeon:  DO Maria Luz Responsible Provider:  GALLO Combs CRNA    Anesthesia Type:  general ASA Status:  3          Anesthesia Type: general    Luis Phase I:      Luis Phase II:      Last vitals: Reviewed and per EMR flowsheets.        Anesthesia Post Evaluation    Patient location during evaluation: PACU  Patient participation: complete - patient participated  Level of consciousness: awake and alert  Pain score: 0  Airway patency: patent  Nausea & Vomiting: no nausea and no vomiting  Complications: no  Cardiovascular status: hemodynamically stable  Respiratory status: acceptable, spontaneous ventilation and nasal cannula  Hydration status: euvolemic  Comments: Cedric, tongue midline

## 2020-11-24 VITALS
HEIGHT: 62 IN | BODY MASS INDEX: 49.69 KG/M2 | OXYGEN SATURATION: 95 % | HEART RATE: 54 BPM | WEIGHT: 270 LBS | RESPIRATION RATE: 18 BRPM | DIASTOLIC BLOOD PRESSURE: 53 MMHG | SYSTOLIC BLOOD PRESSURE: 115 MMHG | TEMPERATURE: 96.9 F

## 2020-11-24 LAB
ANION GAP SERPL CALCULATED.3IONS-SCNC: 12 MMOL/L (ref 7–19)
BUN BLDV-MCNC: 32 MG/DL (ref 8–23)
CALCIUM SERPL-MCNC: 8.9 MG/DL (ref 8.8–10.2)
CHLORIDE BLD-SCNC: 100 MMOL/L (ref 98–111)
CO2: 26 MMOL/L (ref 22–29)
CREAT SERPL-MCNC: 1.7 MG/DL (ref 0.5–0.9)
EKG P AXIS: 64 DEGREES
EKG P-R INTERVAL: 146 MS
EKG Q-T INTERVAL: 406 MS
EKG QRS DURATION: 94 MS
EKG QTC CALCULATION (BAZETT): 399 MS
EKG T AXIS: 46 DEGREES
GFR AFRICAN AMERICAN: 36
GFR NON-AFRICAN AMERICAN: 30
GLUCOSE BLD-MCNC: 176 MG/DL (ref 74–109)
GLUCOSE BLD-MCNC: 196 MG/DL (ref 70–99)
HCT VFR BLD CALC: 27.2 % (ref 37–47)
HEMOGLOBIN: 8.3 G/DL (ref 12–16)
MCH RBC QN AUTO: 27.3 PG (ref 27–31)
MCHC RBC AUTO-ENTMCNC: 30.5 G/DL (ref 33–37)
MCV RBC AUTO: 89.5 FL (ref 81–99)
PDW BLD-RTO: 15.7 % (ref 11.5–14.5)
PERFORMED ON: ABNORMAL
PLATELET # BLD: 244 K/UL (ref 130–400)
PMV BLD AUTO: 10.6 FL (ref 9.4–12.3)
POTASSIUM SERPL-SCNC: 5.2 MMOL/L (ref 3.5–5)
RBC # BLD: 3.04 M/UL (ref 4.2–5.4)
SODIUM BLD-SCNC: 138 MMOL/L (ref 136–145)
WBC # BLD: 10.7 K/UL (ref 4.8–10.8)

## 2020-11-24 PROCEDURE — 6370000000 HC RX 637 (ALT 250 FOR IP): Performed by: SURGERY

## 2020-11-24 PROCEDURE — 2700000000 HC OXYGEN THERAPY PER DAY

## 2020-11-24 PROCEDURE — 99024 POSTOP FOLLOW-UP VISIT: CPT | Performed by: PHYSICIAN ASSISTANT

## 2020-11-24 PROCEDURE — 85027 COMPLETE CBC AUTOMATED: CPT

## 2020-11-24 PROCEDURE — 80048 BASIC METABOLIC PNL TOTAL CA: CPT

## 2020-11-24 PROCEDURE — 82947 ASSAY GLUCOSE BLOOD QUANT: CPT

## 2020-11-24 PROCEDURE — 36415 COLL VENOUS BLD VENIPUNCTURE: CPT

## 2020-11-24 PROCEDURE — 6360000002 HC RX W HCPCS: Performed by: SURGERY

## 2020-11-24 PROCEDURE — 2580000003 HC RX 258: Performed by: SURGERY

## 2020-11-24 RX ORDER — TRAMADOL HYDROCHLORIDE 50 MG/1
50 TABLET ORAL EVERY 6 HOURS PRN
Qty: 40 TABLET | Refills: 0 | OUTPATIENT
Start: 2020-11-24 | End: 2020-12-08

## 2020-11-24 RX ADMIN — ALLOPURINOL 300 MG: 300 TABLET ORAL at 09:24

## 2020-11-24 RX ADMIN — METFORMIN HYDROCHLORIDE 1000 MG: 500 TABLET ORAL at 09:24

## 2020-11-24 RX ADMIN — SODIUM CHLORIDE, PRESERVATIVE FREE 10 ML: 5 INJECTION INTRAVENOUS at 09:27

## 2020-11-24 RX ADMIN — DULOXETINE 60 MG: 60 CAPSULE, DELAYED RELEASE ORAL at 09:24

## 2020-11-24 RX ADMIN — ENOXAPARIN SODIUM 40 MG: 40 INJECTION SUBCUTANEOUS at 09:21

## 2020-11-24 RX ADMIN — FAMOTIDINE 20 MG: 20 TABLET ORAL at 09:23

## 2020-11-24 RX ADMIN — CLOPIDOGREL BISULFATE 75 MG: 75 TABLET ORAL at 09:24

## 2020-11-24 RX ADMIN — LEVOTHYROXINE SODIUM 125 MCG: 125 TABLET ORAL at 06:58

## 2020-11-24 ASSESSMENT — PAIN SCALES - GENERAL: PAINLEVEL_OUTOF10: 1

## 2020-11-24 NOTE — DISCHARGE SUMMARY
[facility]    [id number] (#3)    1950    [id number]/[id number] (#3)    11/24/2020 (#1)    12:15 PM (#1)    11/23/2020  6:05 AM (#2)    Post op Day : 1     Procedure : Left CEA    Surgeon: Danny Jolley    Condition at time of discharge - stable    Dispostion - Home    History of Present Illness - Mrs. Quinn Gomez is a 70 yo female who has a history of LENA and uses a CPAP and Carotid Artery Stenosis. She developed left visual disturbances 2-3 months ago. She was found to have a left carotid bruit and a CDU was obtained that showed a 70-99% stenosis of the left ICA. CTA neck was done and showed a string sign left ICA. Options were discussed regarding performing a Left CEA. She was agreeable and was admitted for this reason. Summary of Treatment and [address] Lorenzo was admitted on 11/23/2020  6:05 AM.  She underwent a left  carotid endarterectomy. She did well with the procedure. She did develop some hypotension post op that responded to a fluid bolus. She was started on a antiplatelet therapy. Head of bed was kept elevated to prevent edema development. Post operative labs were within expected limits. She was allowed up out of bed. She tolerated a regular diet. Neuro remained baseline. NATASHA had minimal discharge and was discontinued and steri-striped. She was allowed to be discharged home.     Follow up appts - 10-14 days    Discharge medications - Scheduled Meds:    allopurinol  300 mg Oral Daily    DULoxetine  60 mg Oral Daily    famotidine  20 mg Oral Daily    [Held by provider] hydrALAZINE  25 mg Oral BID    levothyroxine  125 mcg Oral Daily    metFORMIN  1,000 mg Oral BID WC    [Held by provider] lisinopril  20 mg Oral Daily    [Held by provider] propranolol  20 mg Oral BID    sodium chloride flush  10 mL Intravenous 2 times per day    enoxaparin  40 mg Subcutaneous Daily    clopidogrel  75 mg Oral Daily    verapamil  240 mg Oral Nightly    (#5)    Discharge Diagnosis - 1. Carotid Artery Stenosis, s/p Left CEA    Discharge instructions - Keep head of bed elevated to prevent edema. Wash incision twice daily with soap and water. Do not apply anneliese, cream, or peroxide to incision. If drainage, redness, or increased swelling develop, call our office right away. No driving for 1 week. We will give her Ultram 50 mg every 6 hours PRN pain # 40 called in to patient's pharmacy.  We will f/u in 10-14 days in office for post op check

## 2020-11-24 NOTE — PLAN OF CARE
parameters  Outcome: Ongoing  Goal: Ability to reestablish a normal urinary elimination pattern will improve - after catheter removal  Description: Ability to reestablish a normal urinary elimination pattern will improve  Outcome: Ongoing  Goal: Absence of postvoid residual urine  Description: Absence of postvoid residual urine  Outcome: Ongoing  Goal: Absence of urinary tract infection signs and symptoms  Description: Absence of urinary tract infection signs and symptoms  Outcome: Ongoing     Problem: Fluid Volume - Risk of, Imbalance:  Goal: Absence of imbalanced fluid volume signs and symptoms  Description: Absence of imbalanced fluid volume signs and symptoms  Outcome: Ongoing  Goal: Will show no signs and symptoms of excessive bleeding  Description: Will show no signs and symptoms of excessive bleeding  Outcome: Ongoing  Goal: Ability to maintain a balanced intake and output will improve  Description: Ability to maintain a balanced intake and output will improve  Outcome: Ongoing  Goal: Absence of angry outbursts  Description: Hemoglobin within specified parameters  Outcome: Ongoing     Problem:  Bowel Function - Altered:  Goal: Bowel function will improve to within specified parameters  Description: Bowel function will improve to within specified parameters  Outcome: Ongoing  Goal: Active bowel sounds  Description: Active bowel sounds  Outcome: Ongoing  Goal: Bowel elimination is within specified parameters  Description: Bowel elimination is within specified parameters  Outcome: Ongoing     Problem: Infection - Risk of, Methicillin-Resistant Staphylococcus Aureus Infection:  Goal: Absence of methicillin-resistant Staphylococcus aureus infection  Description: Absence of methicillin-resistant Staphylococcus aureus infection  Outcome: Ongoing     Problem: Infection - Risk of, Surgical Site:  Goal: Demonstration of wound healing without infection will improve  Description: Demonstration of wound healing without infection will improve  Outcome: Ongoing  Goal: Ability to maintain appropriate glucose levels will improve to within specified parameters  Description: Ability to maintain appropriate glucose levels will improve to within specified parameters  Outcome: Ongoing     Problem: Injury - Risk of, Adverse Drug Event:  Goal: Absence of adverse drug events  Description: Absence of adverse drug events  Outcome: Ongoing  Goal: Absence of medication errors  Description: Absence of medication errors  Outcome: Ongoing     Problem: Mental Status - Risk of, Impaired:  Goal: Mental status will be restored to baseline  Description: Mental status will be restored to baseline  Outcome: Ongoing     Problem: Nutrition Deficit - Risk of:  Goal: Ability to achieve adequate nutritional intake will improve  Description: Ability to achieve adequate nutritional intake will improve  Outcome: Ongoing     Problem: Pressure Ulcer - Risk of:  Goal: Absence of pressure ulcer  Description: Absence of pressure ulcer  Outcome: Ongoing     Problem: Tissue Perfusion - Cardiopulmonary, Altered:  Goal: Circulatory function within specified parameters  Description: Circulatory function within specified parameters  Outcome: Ongoing  Goal: Will show no evidence of cardiac arrhythmias  Description: Will show no evidence of cardiac arrhythmias  Outcome: Ongoing     Problem: Venous Thromboembolism - Risk of:  Goal: Will show no signs or symptoms of venous thromboembolism  Description: Will show no signs or symptoms of venous thromboembolism  Outcome: Ongoing

## 2020-11-24 NOTE — PROGRESS NOTES
Vascular Surgery  Dr.Victoria Rich   Daily Progress Note    Pt Name: Marlene [address] Record Number: [id number]  Date of Birth 1950   Today's Date: 11/24/2020    SUBJECTIVE:     Patient was seen and examined, she is sitting in chair at bedside.   Pain is controlled, stating she took a pain pill last night and slept well  No complaints of nausea or difficulty swallowing  Feels a little \"weak after surgery\"      OBJECTIVE:     Patient Vitals for the past 24 hrs:   BP Temp Temp src Pulse Resp SpO2   11/24/20 0720 -- -- -- -- 20 97 %   11/24/20 0556 (!) 120/54 96.9 °F (36.1 °C) -- 62 18 98 %   11/24/20 0504 (!) 98/50 96.7 °F (35.9 °C) Temporal 55 16 96 %   11/24/20 0008 (!) 95/51 96.8 °F (36 °C) Temporal 59 18 96 %   11/23/20 2215 (!) 97/41 97.1 °F (36.2 °C) Temporal 58 16 94 %   11/23/20 2038 (!) 95/48 98.5 °F (36.9 °C) Temporal 55 16 99 %   11/23/20 1846 (!) 95/47 98.3 °F (36.8 °C) Temporal 56 16 99 %   11/23/20 1642 (!) 96/36 97.3 °F (36.3 °C) Temporal 56 18 98 %   11/23/20 1524 -- -- -- -- 20 95 %   11/23/20 1418 (!) 80/55 97 °F (36.1 °C) Temporal 67 18 94 %   11/23/20 1245 (!) 98/44 97.1 °F (36.2 °C) Temporal 62 18 94 %   11/23/20 1148 (!) 100/54 96 °F (35.6 °C) Temporal 59 18 96 %   11/23/20 1135 (!) 100/41 98.2 °F (36.8 °C) Temporal 62 15 95 %   11/23/20 1130 (!) 101/44 -- -- 60 15 95 %   11/23/20 1125 (!) 105/43 -- -- 57 15 95 %   11/23/20 1120 (!) 101/39 -- -- 58 14 94 %   11/23/20 1115 (!) 103/40 -- -- 59 17 95 %   11/23/20 1110 (!) 101/35 -- -- 66 17 95 %   11/23/20 1105 (!) 106/36 -- -- 60 14 95 %   11/23/20 1055 -- -- -- 61 15 95 %   11/23/20 1050 -- -- -- 62 13 95 %   11/23/20 1045 -- -- -- 66 19 95 %   11/23/20 1040 -- -- -- 64 14 94 %   11/23/20 1035 -- -- -- 65 12 94 %   11/23/20 1030 -- -- -- 67 12 93 %   11/23/20 1026 -- -- -- 69 12 94 %   11/23/20 1025 -- -- -- 68 14 92 %   11/23/20 1024 -- -- -- 72 15 94 %   11/23/20 1023 -- -- -- 72 13 93 %   11/23/20 1022 -- -- -- 71 14 94 % 11/23/20 1020 (!) 107/36 97.4 °F (36.3 °C) Temporal 70 12 93 %   11/23/20 1019 -- -- -- 71 -- --   11/23/20 1018 (!) 106/30 97.6 °F (36.4 °C) -- 72 11 94 %         Intake/Output Summary (Last 24 hours) at 11/24/2020 0914  Last data filed at 11/23/2020 1450  Gross per 24 hour   Intake 940 ml   Output --   Net 940 ml       No intake/output data recorded. I/O last 3 completed shifts: In: 1940 [P.O.:240; I.V.:1700]  Out: -        Wt Readings from Last 3 Encounters:   11/23/20 270 lb (122.5 kg)   11/19/20 270 lb (122.5 kg)   11/12/20 272 lb (123.4 kg)        Body mass index is 49.38 kg/m². Diet: DIET CARB CONTROL;      MEDS:     Scheduled Meds:   allopurinol  300 mg Oral Daily    DULoxetine  60 mg Oral Daily    famotidine  20 mg Oral Daily    [Held by provider] hydrALAZINE  25 mg Oral BID    levothyroxine  125 mcg Oral Daily    metFORMIN  1,000 mg Oral BID WC    [Held by provider] lisinopril  20 mg Oral Daily    [Held by provider] propranolol  20 mg Oral BID    sodium chloride flush  10 mL Intravenous 2 times per day    enoxaparin  40 mg Subcutaneous Daily    clopidogrel  75 mg Oral Daily    verapamil  240 mg Oral Nightly     Continuous Infusions:  PRN Meds:sodium chloride flush, 10 mL, PRN  traMADol, 50 mg, Q4H PRN  acetaminophen, 650 mg, Q4H PRN      PHYSICAL EXAM:     CONSTITUTIONAL: Alert and oriented times 3, no acute distress and cooperative to examination. NECK: Soft, trachea midline and straight  LUNGS: Chest expands equally bilaterally upon respiration, no accessory muscle used. Ausculation reveals no wheezes, rales or rhonchi. CARDIOVASCULAR: Heart regular rate and rhythm, murmur noted  ABDOMEN: soft, nontender, nondistended  NEUROLOGIC: Awake, alert, oriented to name, place and time. WOUND/INCISION:  Left neck incision line ARLEEN with steri strips intact, edges well approximated .  Mild edema, mild bruising  EXTREMITY:Feet warm to touch/ pink color, mild edema to LE's    LABS:     CBC:

## 2020-12-07 ENCOUNTER — TELEPHONE (OUTPATIENT)
Dept: VASCULAR SURGERY | Age: 70
End: 2020-12-07

## 2020-12-08 ENCOUNTER — OFFICE VISIT (OUTPATIENT)
Dept: VASCULAR SURGERY | Age: 70
End: 2020-12-08

## 2020-12-08 VITALS
HEART RATE: 71 BPM | WEIGHT: 254 LBS | RESPIRATION RATE: 16 BRPM | BODY MASS INDEX: 46.74 KG/M2 | SYSTOLIC BLOOD PRESSURE: 122 MMHG | DIASTOLIC BLOOD PRESSURE: 67 MMHG | HEIGHT: 62 IN

## 2020-12-08 PROCEDURE — 99024 POSTOP FOLLOW-UP VISIT: CPT | Performed by: PHYSICIAN ASSISTANT

## 2020-12-08 RX ORDER — CLOPIDOGREL BISULFATE 75 MG/1
75 TABLET ORAL DAILY
Qty: 30 TABLET | Refills: 2 | Status: SHIPPED | OUTPATIENT
Start: 2020-12-08 | End: 2021-03-08

## 2020-12-08 NOTE — PROGRESS NOTES
Mrs. Jasmyne Garcia is a 70 yo female who presents for post op evaluation. She had a left carotid endarterectomy by Dr. Maryann Glynn on 11/23/202. She reports that the skin around her incision is red and itching since surgery. She  reports no fever/chills. On evaluation today, incision is clean, dry, intact. She has a erythematous eczematous pruritic eruption of the left side of the neck surrounding the incision that appears to be a contact dermatitis. Symptoms of infection are absent. She will need to continue clopidogrel 75 mg po qd. She is to wash incision daily with soap and H2O and pat dry. She is to use hydrocortisone cream BID to the rash on her left neck avoiding the incision. Patient verbalized understanding. We will follow up with her in 3 months with a CDU or sooner if fever develops or wound deteriorates. As always, we want to thank you for allowing us to participate in the care of your patients.       Sincerely,     Stephanie Sheehan PA-C

## 2021-03-08 RX ORDER — CLOPIDOGREL BISULFATE 75 MG/1
TABLET ORAL
Qty: 90 TABLET | Refills: 1 | Status: SHIPPED | OUTPATIENT
Start: 2021-03-08 | End: 2021-08-31

## 2021-04-19 ENCOUNTER — OFFICE VISIT (OUTPATIENT)
Dept: VASCULAR SURGERY | Age: 71
End: 2021-04-19
Payer: MEDICARE

## 2021-04-19 VITALS
RESPIRATION RATE: 18 BRPM | BODY MASS INDEX: 49.69 KG/M2 | WEIGHT: 270 LBS | DIASTOLIC BLOOD PRESSURE: 69 MMHG | HEIGHT: 62 IN | HEART RATE: 82 BPM | SYSTOLIC BLOOD PRESSURE: 148 MMHG | OXYGEN SATURATION: 98 %

## 2021-04-19 DIAGNOSIS — I65.23 BILATERAL CAROTID ARTERY STENOSIS: Primary | ICD-10-CM

## 2021-04-19 PROCEDURE — 99212 OFFICE O/P EST SF 10 MIN: CPT | Performed by: PHYSICIAN ASSISTANT

## 2021-04-19 PROCEDURE — G8400 PT W/DXA NO RESULTS DOC: HCPCS | Performed by: PHYSICIAN ASSISTANT

## 2021-04-19 PROCEDURE — G8427 DOCREV CUR MEDS BY ELIG CLIN: HCPCS | Performed by: PHYSICIAN ASSISTANT

## 2021-04-19 PROCEDURE — 4040F PNEUMOC VAC/ADMIN/RCVD: CPT | Performed by: PHYSICIAN ASSISTANT

## 2021-04-19 PROCEDURE — G8417 CALC BMI ABV UP PARAM F/U: HCPCS | Performed by: PHYSICIAN ASSISTANT

## 2021-04-19 PROCEDURE — 1123F ACP DISCUSS/DSCN MKR DOCD: CPT | Performed by: PHYSICIAN ASSISTANT

## 2021-04-19 PROCEDURE — 3017F COLORECTAL CA SCREEN DOC REV: CPT | Performed by: PHYSICIAN ASSISTANT

## 2021-04-19 PROCEDURE — 1090F PRES/ABSN URINE INCON ASSESS: CPT | Performed by: PHYSICIAN ASSISTANT

## 2021-04-19 PROCEDURE — 1036F TOBACCO NON-USER: CPT | Performed by: PHYSICIAN ASSISTANT

## 2021-04-19 NOTE — PROGRESS NOTES
Patient Care Team:  Chace Wheatley MD as PCP - General (Internal Medicine)  Chace Wheatley MD as PCP - [facility]  TEDDY Greco as Physician Assistant (Physician Assistant Medical)      Ernesto Badillo (:  1950) is a 69 y.o. female,Established patient, here for evaluation of the following chief complaint(s):  3 Month Follow-Up (carotid)      SUBJECTIVE/OBJECTIVE:  She presents for follow up of carotid artery stenosis. Today we are following up for this. Her current treatment includes clopidogrel 75 mg po qd. She denies a history of CVA. She denies any new onset of partial or complete loss of vision affecting only one eye, speech difficulty or lateralizing weakness, numbness/tingling. She still has some numbness and nerve type pain left neck. Ernesto Badillo is a 69 y.o. female with the [facility]:  Patient Active Problem List    Diagnosis Date Noted    Carotid artery stenosis, symptomatic, left 2020    Carotid stenosis, left     Obstructive sleep apnea     Restless leg syndrome     CPAP (continuous positive airway pressure) dependence      Current Outpatient Medications   Medication Sig Dispense Refill    clopidogrel (PLAVIX) 75 MG tablet TAKE 1 TABLET BY MOUTH EVERY DAY 90 tablet 1    verapamil (VERELAN) 240 MG extended release capsule Take 240 mg by mouth nightly      lisinopril (PRINIVIL;ZESTRIL) 20 MG tablet Take 20 mg by mouth daily      famotidine (PEPCID) 20 MG tablet Take 20 mg by mouth daily      senna-docusate (PERICOLACE) 8.6-50 MG per tablet Take 2 tablets by mouth daily      fluocinonide (LIDEX) 0.05 % cream Apply topically 2 times daily Apply topically 2 times daily.       clopidogrel (PLAVIX) 75 MG tablet Take 1 tablet by mouth daily 30 tablet 0    ketoconazole (NIZORAL) 2 % cream Apply topically 2 times daily   3    triamcinolone (KENALOG) 0.1 % cream Apply topically 2 times daily   3    hydrALAZINE (APRESOLINE) 25 MG tablet Take 25 mg by mouth 2 times daily       allopurinol (ZYLOPRIM) 100 MG tablet Take 300 mg by mouth daily       docusate sodium (COLACE) 100 MG capsule Take 100 mg by mouth 4 times daily      furosemide (LASIX) 40 MG tablet Take 40 mg by mouth 2 times daily   3    cyanocobalamin 1000 MCG/ML injection Inject 1,000 mcg into the skin every 14 days   2    DULoxetine (CYMBALTA) 60 MG capsule Take 60 mg by mouth daily   3    ZETIA 10 MG tablet Take 10 mg by mouth daily   3    FREESTYLE LITE strip 1 ITEM DAILY TEST BLOOD SUGAR ONCE DAILY. DX:E11.9  3    levothyroxine (SYNTHROID) 125 MCG tablet Take 125 mcg by mouth Daily   2    metFORMIN (GLUCOPHAGE) 1000 MG tablet Take 1,000 mg by mouth 2 times daily (with meals) 1000mg BID  11    potassium chloride (MICRO-K) 10 MEQ CR capsule Take 10 mEq by mouth daily   3    propranolol (INDERAL) 10 MG tablet Take 20 mg by mouth 2 times daily   0    vitamin E 400 UNIT capsule Take 400 Units by mouth daily      CPAP Machine MISC by Does not apply route       No current facility-administered medications for this visit. Allergies: Latex, Ibuprofen, Asa [aspirin], Bee venom, Betadine [povidone iodine], Codeine, Donnatal [phenobarbital-belladonna alk], Fulvicin-p-g [griseofulvin], Tape [adhesive tape], Lortab [hydrocodone-acetaminophen], Pravastatin, and Wasp venom  Past Medical History:   Diagnosis Date    Acid reflux     Arthritis     CPAP (continuous positive airway pressure) dependence     8cm to 16cm    Diabetes mellitus (HCC)     Gout     Heart murmur     Hyperlipidemia     Hypertension     Obstructive sleep apnea     AHI:  20.5;  In REM AHI of 56.5 per PSG, 7/2010, c pap    Psoriasis     Restless leg syndrome     Thyroid disease      Past Surgical History:   Procedure Laterality Date    BREAST BIOPSY Left     CARDIAC CATHETERIZATION      CAROTID ENDARTERECTOMY Left 11/23/2020    LEFT CAROTID ENDARTERECTOMY performed by [facility] diabetes. ASSESSMENT/PLAN:      1. Carotid Artery Stenosis, S/P left CEA      Recommend she continue Plavix 75 mg daily  Recommend consider statin therapy  Recommend no smoking  Recommend good BP control  We will obtain a CDU and call her with the results      An electronic signature was used to authenticate this note.     --Farzad Patel PA-C

## 2021-04-20 ENCOUNTER — HOSPITAL ENCOUNTER (OUTPATIENT)
Dept: VASCULAR LAB | Age: 71
Discharge: HOME | End: 2021-04-20
Payer: MEDICARE

## 2021-04-20 DIAGNOSIS — I65.23 BILATERAL CAROTID ARTERY STENOSIS: ICD-10-CM

## 2021-04-20 PROCEDURE — 93880 EXTRACRANIAL BILAT STUDY: CPT

## 2021-04-21 ENCOUNTER — TELEPHONE (OUTPATIENT)
Dept: VASCULAR SURGERY | Age: 71
End: 2021-04-21

## 2021-04-21 DIAGNOSIS — I65.23 BILATERAL CAROTID ARTERY STENOSIS: Primary | ICD-10-CM

## 2021-04-21 NOTE — TELEPHONE ENCOUNTER
I called pt and informed her of study results and would repeat in 1 year. Pt verbalized her understanding and agreed.

## 2021-05-05 ENCOUNTER — TELEPHONE (OUTPATIENT)
Dept: VASCULAR SURGERY | Age: 71
End: 2021-05-05

## 2021-05-05 NOTE — TELEPHONE ENCOUNTER
I called pt and informed her that I have cancelled appts that was scheduled. Advised pt that she will need to have repeat and f/u 12 months. Pt stated that she is suppose to have a study and f/u every 3, 6, 12 months. I will check with Mel Diez when she returns from [facility].

## 2021-05-05 NOTE — TELEPHONE ENCOUNTER
Patient is requesting a return call  from the office. Patient is scheduled for a  carotid Bilateral on 6/14 and a follow up with [facility] on 6/15. Patient says she recently had  one on 4/20. She wants to know if she needs to have another one. She says if she needs to she will. Patient states she will need her follow up appt scheduled for another day. Please return call. Thank you.

## 2021-05-11 ENCOUNTER — TELEPHONE (OUTPATIENT)
Dept: VASCULAR SURGERY | Age: 71
End: 2021-05-11

## 2021-05-11 DIAGNOSIS — Z09 ENCOUNTER FOR FOLLOW-UP OF ACUTE DEEP VEIN THROMBOSIS (DVT) OF LEFT LOWER EXTREMITY: Primary | ICD-10-CM

## 2021-05-11 DIAGNOSIS — Z86.718 ENCOUNTER FOR FOLLOW-UP OF ACUTE DEEP VEIN THROMBOSIS (DVT) OF LEFT LOWER EXTREMITY: Primary | ICD-10-CM

## 2021-05-11 NOTE — TELEPHONE ENCOUNTER
----- Message from Gini Del Cid PA-C sent at 5/10/2021  6:04 PM CDT -----  Rafita Xiao, please call and let her know that her CDU on 4/20/21 was her 3 month f/u test, both ICA's < 50% stenosis and we will repeat again in 6 months from 4/20/21. Please place order.  TY

## 2021-08-31 RX ORDER — CLOPIDOGREL BISULFATE 75 MG/1
TABLET ORAL
Qty: 90 TABLET | Refills: 1 | Status: SHIPPED | OUTPATIENT
Start: 2021-08-31 | End: 2022-02-28

## 2021-10-08 ENCOUNTER — OFFICE VISIT (OUTPATIENT)
Dept: NEUROLOGY | Age: 71
End: 2021-10-08
Payer: MEDICARE

## 2021-10-08 VITALS
SYSTOLIC BLOOD PRESSURE: 139 MMHG | WEIGHT: 270 LBS | HEIGHT: 62 IN | HEART RATE: 75 BPM | OXYGEN SATURATION: 99 % | BODY MASS INDEX: 49.69 KG/M2 | DIASTOLIC BLOOD PRESSURE: 83 MMHG

## 2021-10-08 DIAGNOSIS — Z99.89 CPAP (CONTINUOUS POSITIVE AIRWAY PRESSURE) DEPENDENCE: ICD-10-CM

## 2021-10-08 DIAGNOSIS — G47.33 OBSTRUCTIVE SLEEP APNEA: Primary | ICD-10-CM

## 2021-10-08 DIAGNOSIS — G25.81 RESTLESS LEG SYNDROME: ICD-10-CM

## 2021-10-08 PROBLEM — R09.89 LEFT CAROTID BRUIT: Status: ACTIVE | Noted: 2021-10-08

## 2021-10-08 PROCEDURE — G8484 FLU IMMUNIZE NO ADMIN: HCPCS | Performed by: PHYSICIAN ASSISTANT

## 2021-10-08 PROCEDURE — G8417 CALC BMI ABV UP PARAM F/U: HCPCS | Performed by: PHYSICIAN ASSISTANT

## 2021-10-08 PROCEDURE — G8400 PT W/DXA NO RESULTS DOC: HCPCS | Performed by: PHYSICIAN ASSISTANT

## 2021-10-08 PROCEDURE — 1090F PRES/ABSN URINE INCON ASSESS: CPT | Performed by: PHYSICIAN ASSISTANT

## 2021-10-08 PROCEDURE — 3017F COLORECTAL CA SCREEN DOC REV: CPT | Performed by: PHYSICIAN ASSISTANT

## 2021-10-08 PROCEDURE — 4040F PNEUMOC VAC/ADMIN/RCVD: CPT | Performed by: PHYSICIAN ASSISTANT

## 2021-10-08 PROCEDURE — 1036F TOBACCO NON-USER: CPT | Performed by: PHYSICIAN ASSISTANT

## 2021-10-08 PROCEDURE — 1123F ACP DISCUSS/DSCN MKR DOCD: CPT | Performed by: PHYSICIAN ASSISTANT

## 2021-10-08 PROCEDURE — G8427 DOCREV CUR MEDS BY ELIG CLIN: HCPCS | Performed by: PHYSICIAN ASSISTANT

## 2021-10-08 PROCEDURE — 99214 OFFICE O/P EST MOD 30 MIN: CPT | Performed by: PHYSICIAN ASSISTANT

## 2021-10-08 NOTE — LETTER
[facility]  [address] [place] [address] [address]  [place]  Phone [phone]  Fax [phone]             Re:  Andreea Simpson    10/08/21  :  1950  Address: [address] [place] [address]       Replinishible PAP Supplies, 1 year supply  Item HPCPS Code Frequency   Mask of choice  or  1 per 3 months   Nasal Mask cushion/pillows [id number] or  2 per 30 days   Full Face Mask Interface  1 per 30 days   Headgear  1 per 6 months   Tubing, length of choice  or  1 per 3 months   Water Chamber  1 per 6 months   Chinstrap  1 per 6 months   Disposable Filters  2 per 30 days   Reusable Filters  1 per 6 months     Diagnoses:  Obstructive sleep apnea (G47.33)  Length of Need: Lifetime, 99    Ordering Provider: José Mccollum PA-C  NPI:  [id number]        Signature: [unfilled]        Date: 10/8/2021      Electronically Signed by José Mccollum PA-C  on 10/8/2021 at 11:27 AM

## 2021-10-08 NOTE — PROGRESS NOTES
[id number] [facility]  [address] [place] [address] [address], [address]  [place]  Phone [phone]  Fax [phone]         [facility] Sleep Follow Up Encounter      Information:   Patient Name: Angel Fernandez  :   1950  Age:   69 y.o. MRN:   [id number]  Account #:  [de-identified]  Today:                10/8/21    Provider:  Zo Guaman PA-C    Chief Complaint   Patient presents with    [employer]     follow up        Subjective:   Angel Fernandez is a 69 y.o. female  with a history of severe LENA, RLS, left carotid bruit, left carotid stenosis, and heart murmur  who comes in for a sleep clinic follow up. The PSG, 2010 revealed an AHI of 20.5; in REM AHI of 56.5. She is prescribed auto CPAP with a pressure range of 8cm to 16cm. The compliance report indicates that she is averaging 7.5 hours of CPAP use per day. She reports that consistent CPAP use has alleviated the previous LENA symptoms. The RLS is stable. She has chronic knee pain. In regards to the left carotid stenosis, she did have a carotid endarterectomy 2020. Location or symptom:  LENA  Onset:  PS  Timing:  q hs  Severity:  Severe  Associated:  Snoring, witnessed apneas, and excessive daytime somnolence  Alleviated:  CPAP (2nd CPAP)       Objective:     Past Medical History:   Diagnosis Date    Acid reflux     Arthritis     CPAP (continuous positive airway pressure) dependence     8cm to 16cm    Diabetes mellitus (HCC)     Gout     Heart murmur     Hyperlipidemia     Hypertension     Obstructive sleep apnea     AHI:  20.5;  In REM AHI of 56.5 per PSG, 2010, c pap    Psoriasis     Restless leg syndrome     Thyroid disease        Past Surgical History:   Procedure Laterality Date    BREAST BIOPSY Left     CARDIAC CATHETERIZATION      CAROTID ENDARTERECTOMY Left 2020    LEFT CAROTID ENDARTERECTOMY performed by Aldo Hobbs DO at [place] [address] Right     COLONOSCOPY   and 2008    FOOT SURGERY Right     mortons     HYSTERECTOMY      KNEE SURGERY Left     replacement    RECTAL SURGERY      abcess gland    STOMACH SURGERY      tumor removed       Recent Hospitalizations  ·     Significant Injuries  ·     Family History   Problem Relation Age of Onset    Other Sister         blood clots    Heart Attack Mother     Heart Disease Mother     Diabetes Mother     High Blood Pressure Mother     Heart Attack Father     Heart Disease Father     Diabetes Father     High Blood Pressure Father     Heart Disease Brother     Diabetes Brother     Heart Attack Brother 39    Breast Cancer Sister        Social History  Social History     Tobacco Use   Smoking Status Never Smoker   Smokeless Tobacco Never Used     Social History     Substance and Sexual Activity   Alcohol Use No     Social History     Substance and Sexual Activity   Drug Use No         Current Outpatient Medications   Medication Sig Dispense Refill    clopidogrel (PLAVIX) 75 MG tablet TAKE 1 TABLET BY MOUTH EVERY DAY 90 tablet 1    verapamil (VERELAN) 240 MG extended release capsule Take 240 mg by mouth nightly      lisinopril (PRINIVIL;ZESTRIL) 20 MG tablet Take 20 mg by mouth daily      famotidine (PEPCID) 20 MG tablet Take 20 mg by mouth daily      senna-docusate (PERICOLACE) 8.6-50 MG per tablet Take 2 tablets by mouth daily      fluocinonide (LIDEX) 0.05 % cream Apply topically 2 times daily Apply topically 2 times daily.       ketoconazole (NIZORAL) 2 % cream Apply topically 2 times daily   3    triamcinolone (KENALOG) 0.1 % cream Apply topically 2 times daily   3    hydrALAZINE (APRESOLINE) 25 MG tablet Take 25 mg by mouth 2 times daily       allopurinol (ZYLOPRIM) 100 MG tablet Take 300 mg by mouth daily       docusate sodium (COLACE) 100 MG capsule Take 100 mg by mouth 4 times daily      furosemide (LASIX) 40 MG tablet Take 40 mg by mouth 2 times daily   3    cyanocobalamin 1000 MCG/ML injection Inject 1,000 mcg into the skin every 14 days   2    DULoxetine (CYMBALTA) 60 MG capsule Take 60 mg by mouth daily   3    ZETIA 10 MG tablet Take 10 mg by mouth daily   3    FREESTYLE LITE strip 1 ITEM DAILY TEST BLOOD SUGAR ONCE DAILY. DX:E11.9  3    levothyroxine (SYNTHROID) 125 MCG tablet Take 125 mcg by mouth Daily   2    metFORMIN (GLUCOPHAGE) 1000 MG tablet Take 1,000 mg by mouth 2 times daily (with meals) 1000mg BID  11    potassium chloride (MICRO-K) 10 MEQ CR capsule Take 10 mEq by mouth daily   3    propranolol (INDERAL) 10 MG tablet Take 20 mg by mouth 2 times daily   0    vitamin E 400 UNIT capsule Take 400 Units by mouth daily      CPAP Machine MISC by Does not apply route       No current facility-administered medications for this visit. Allergies:  Latex, Ibuprofen, Asa [aspirin], Bee venom, Betadine [povidone iodine], Codeine, Donnatal [phenobarbital-belladonna alk], Fulvicin-p-g [griseofulvin], Tape [adhesive tape], Lortab [hydrocodone-acetaminophen], Pravastatin, and Wasp venom    REVIEW OF SYSTEMS     Constitutional: []? Fever []? Sweats []? Chills []? Recent Injury   [x]? Denies all unless marked  HENT:[]? Headache  []? Head Injury  []? Sore Throat  []? Ear Pain  []? Dizziness []? Hearing Loss   [x]? Denies all unless marked  Musculoskeletal: []? Arthralgia  []? Myalgias []? Muscle cramps  []? Muscle twitches   [x]? Denies all unless marked   Spine:  []? Neck pain  []? Back pain  []? Sciaticia  [x]? Denies all unless marked  Neurological:[]? Visual Disturbance []? Double Vision []? Slurred Speech []? Trouble swallowing  []? Vertigo []? Tingling []? Numbness []? Weakness []? Loss of Balance   []? Loss of Consciousness []? Memory Loss []? Seizures  [x]? Denies all unless marked  Psychiatric/Behavioral:[]? Depression []? Anxiety  [x]? Denies all unless marked  Sleep: []? Insomnia []? Sleep Disturbance []? Snoring []? Restless Legs []? Daytime Sleepiness [x]? [employer]  []?  Denies all unless marked    The MA has completed the ROS with the patient. I have reviewed it in its' entirety with the patient and agree with the documentation. PHYSICAL EXAM  /83 (Site: Left Upper Arm, Position: Sitting, Cuff Size: Large Adult)   Pulse 75   Ht 5' 2\" (1.575 m)   Wt 270 lb (122.5 kg)   SpO2 99%   Breastfeeding No   BMI 49.38 kg/m²      Constitutional  No acute distress    HEENT- Conjunctiva normal.  No scars, masses, or lesions over external nose or ears, no neck masses noted, no jugular vein distension, questionable left carotid bruit  Cardiac- Regular rate and rhythm; Grade I/VI murmur (previously diagnosed)  Pulmonary- Clear to auscultation, good expansion, normal effort without use of accessory muscles  Musculoskeletal  No significant wasting of muscles noted, no bony deformities  Extremities - No clubbing, cyanosis or edema  Skin  Warm, dry, and intact. No rash, erythema, or pallor  Psychiatric  Mood, affect, and behavior appear normal      Neurologic:  Extraocular movements are intact without nystagmus. Visual fields are full to confrontation. Facial movements are symmetrical and normal.  Speech is precise. Extremity strength is normal in both uppers and lowers. Deep tendon reflexes are intact and symmetrical.  Rapid alternating movements are unimpaired. Finger-to-nose testing is performed well, without dysmetria. Gait is antalgic with a quad cane. I reviewed the following studies:      []  :  Clinical laboratory test results    []  :  Radiology reports    [x]  :  Review and summarization of medical records and/or obtain medical records     []  :  Previous/recent polysomnogram report(s)    []  :  Blackburn Sleepiness Scale      [x]  :  Compliance download: The auto CPAP is set at a pressure rangeo fo 8cm to 16cm. Compliance download shows that she uses device: 100% of the time;  percentage of days with usage >=4 hours: 93%. AHI: 1.4    Assessment:       ICD-10-CM    1. Obstructive sleep apnea  G47.33    2. Restless leg syndrome  G25.81    3. CPAP (continuous positive airway pressure) dependence  Z99.89           []  :  Stable     []  :  Improved                       [x]  :  Well controlled              []  :  Resolving     []  :  Resolved     []  :  Inadequately controlled     []  :  Worsening     []  :  Additional workup planned    Patient is compliant and benefiting from therapy as indicated by compliance evaluation and patient report. Plan:     No orders of the defined types were placed in this encounter. 1.   Patient advised of the etiology,  pathophysiology, diagnosis, treatment options, and risks of untreated LENA. Risks may include, but are not limited to  hypertension, coronary artery disease, diabetes, stroke, weight gain, impaired cognition, daytime somnolence,  and motor vehicle accidents. Advised to abstain from driving or operating heavy machinery when drowsy and the use of respiratory suppressants. 2.  The following educational material has been included in this visit after visit summary for your review: LENA/PAP guidelines/RLS-Discussed with the patient and all questions fully answered. 3. Continue CPAP, but follow up with DME supplier: [employer] device, possible recall on your device. 4.  Order-supplies-[facility]  5. Keep follow up with [facility]  6.   Follow up in 1 year

## 2021-11-02 ENCOUNTER — TELEPHONE (OUTPATIENT)
Dept: VASCULAR SURGERY | Age: 71
End: 2021-11-02

## 2021-11-02 NOTE — TELEPHONE ENCOUNTER
Pt wanted a apt for follow up on carotid/ pt stated it was time for apt it has been 6months.  Got pt apt

## 2021-11-02 NOTE — TELEPHONE ENCOUNTER
Saul Murillo requests that someone  return their call. The best time to reach her is Anytime. The pt wants to know if she needs an US. Please call the pt. Thank you.

## 2021-11-18 ENCOUNTER — OFFICE VISIT (OUTPATIENT)
Dept: VASCULAR SURGERY | Age: 71
End: 2021-11-18
Payer: MEDICARE

## 2021-11-18 ENCOUNTER — HOSPITAL ENCOUNTER (OUTPATIENT)
Dept: VASCULAR LAB | Age: 71
Discharge: HOME | End: 2021-11-18
Payer: MEDICARE

## 2021-11-18 VITALS
BODY MASS INDEX: 49.69 KG/M2 | RESPIRATION RATE: 20 BRPM | WEIGHT: 270 LBS | DIASTOLIC BLOOD PRESSURE: 68 MMHG | HEART RATE: 88 BPM | OXYGEN SATURATION: 94 % | HEIGHT: 62 IN | SYSTOLIC BLOOD PRESSURE: 129 MMHG | TEMPERATURE: 96.2 F

## 2021-11-18 DIAGNOSIS — I65.23 BILATERAL CAROTID ARTERY STENOSIS: ICD-10-CM

## 2021-11-18 DIAGNOSIS — I65.23 BILATERAL CAROTID ARTERY STENOSIS: Primary | ICD-10-CM

## 2021-11-18 PROCEDURE — G8417 CALC BMI ABV UP PARAM F/U: HCPCS | Performed by: PHYSICIAN ASSISTANT

## 2021-11-18 PROCEDURE — G8484 FLU IMMUNIZE NO ADMIN: HCPCS | Performed by: PHYSICIAN ASSISTANT

## 2021-11-18 PROCEDURE — 1090F PRES/ABSN URINE INCON ASSESS: CPT | Performed by: PHYSICIAN ASSISTANT

## 2021-11-18 PROCEDURE — 99213 OFFICE O/P EST LOW 20 MIN: CPT | Performed by: PHYSICIAN ASSISTANT

## 2021-11-18 PROCEDURE — 3017F COLORECTAL CA SCREEN DOC REV: CPT | Performed by: PHYSICIAN ASSISTANT

## 2021-11-18 PROCEDURE — G8400 PT W/DXA NO RESULTS DOC: HCPCS | Performed by: PHYSICIAN ASSISTANT

## 2021-11-18 PROCEDURE — G8427 DOCREV CUR MEDS BY ELIG CLIN: HCPCS | Performed by: PHYSICIAN ASSISTANT

## 2021-11-18 PROCEDURE — 4040F PNEUMOC VAC/ADMIN/RCVD: CPT | Performed by: PHYSICIAN ASSISTANT

## 2021-11-18 PROCEDURE — 1123F ACP DISCUSS/DSCN MKR DOCD: CPT | Performed by: PHYSICIAN ASSISTANT

## 2021-11-18 PROCEDURE — 1036F TOBACCO NON-USER: CPT | Performed by: PHYSICIAN ASSISTANT

## 2021-11-18 PROCEDURE — 93880 EXTRACRANIAL BILAT STUDY: CPT

## 2021-11-18 NOTE — PROGRESS NOTES
Patient Care Team:  Richie Oakley MD as PCP - General (Internal Medicine)  Richie Oakley MD as PCP - RE[facility]ABILITATION [facility]  TEDDY Islas as Physician Assistant (Physician Assistant Medical)      History and Physical:    Gloria Rivero is a 66 y.o. female who has a history of carotid artery stenosis. She is status post left carotid endarterectomy. Today we are following up for her carotid artery stenosis. She does not smoke. Currently she is on Plavix and Zetia daily. she denies any new onset of partial or complete loss of vision affecting only one eye, speech difficulty or lateralizing weakness, numbness/tingling. She reports right knee pain and states that she is in need of a knee surgery. She also reports shortness of breath on exertion. Gloria Rivero is a 66 y.o. female with the following history reviewed and recorded in [facility]:  Patient Active Problem List    Diagnosis Date Noted    Left carotid bruit 10/08/2021    Carotid artery stenosis, symptomatic, left 11/23/2020    Carotid stenosis, left     Obstructive sleep apnea     Restless leg syndrome     CPAP (continuous positive airway pressure) dependence      8cm       Current Outpatient Medications   Medication Sig Dispense Refill    clopidogrel (PLAVIX) 75 MG tablet TAKE 1 TABLET BY MOUTH EVERY DAY 90 tablet 1    verapamil (VERELAN) 240 MG extended release capsule Take 240 mg by mouth nightly      lisinopril (PRINIVIL;ZESTRIL) 20 MG tablet Take 20 mg by mouth daily      famotidine (PEPCID) 20 MG tablet Take 20 mg by mouth daily      senna-docusate (PERICOLACE) 8.6-50 MG per tablet Take 2 tablets by mouth daily      fluocinonide (LIDEX) 0.05 % cream Apply topically 2 times daily Apply topically 2 times daily.       ketoconazole (NIZORAL) 2 % cream Apply topically 2 times daily   3    triamcinolone (KENALOG) 0.1 % cream Apply topically 2 times daily   3    hydrALAZINE (APRESOLINE) 25 MG tablet Take 25 mg by mouth 2 times daily       allopurinol (ZYLOPRIM) 100 MG tablet Take 300 mg by mouth daily       docusate sodium (COLACE) 100 MG capsule Take 100 mg by mouth 4 times daily      furosemide (LASIX) 40 MG tablet Take 40 mg by mouth 2 times daily   3    cyanocobalamin 1000 MCG/ML injection Inject 1,000 mcg into the skin every 14 days   2    DULoxetine (CYMBALTA) 60 MG capsule Take 60 mg by mouth daily   3    ZETIA 10 MG tablet Take 10 mg by mouth daily   3    FREESTYLE LITE strip 1 ITEM DAILY TEST BLOOD SUGAR ONCE DAILY. DX:E11.9  3    levothyroxine (SYNTHROID) 125 MCG tablet Take 125 mcg by mouth Daily   2    metFORMIN (GLUCOPHAGE) 1000 MG tablet Take 1,000 mg by mouth 2 times daily (with meals) 1000mg BID  11    potassium chloride (MICRO-K) 10 MEQ CR capsule Take 10 mEq by mouth daily   3    propranolol (INDERAL) 10 MG tablet Take 20 mg by mouth 2 times daily   0    vitamin E 400 UNIT capsule Take 400 Units by mouth daily      CPAP Machine MISC by Does not apply route       No current facility-administered medications for this visit. Allergies: Latex, Ibuprofen, Asa [aspirin], Bee venom, Betadine [povidone iodine], Codeine, Donnatal [phenobarbital-belladonna alk], Fulvicin-p-g [griseofulvin], Tape [adhesive tape], Lortab [hydrocodone-acetaminophen], Pravastatin, and Wasp venom  Past Medical History:   Diagnosis Date    Acid reflux     Arthritis     CPAP (continuous positive airway pressure) dependence     8cm to 16cm    Diabetes mellitus (HCC)     Gout     Heart murmur     Hyperlipidemia     Hypertension     Obstructive sleep apnea     AHI:  20.5;  In REM AHI of 56.5 per PSG, 7/2010, c pap    Psoriasis     Restless leg syndrome     Thyroid disease      Past Surgical History:   Procedure Laterality Date    BREAST BIOPSY Left     CARDIAC CATHETERIZATION      CAROTID ENDARTERECTOMY Left 11/23/2020    LEFT CAROTID ENDARTERECTOMY performed by Kerrie Louise DO at [address] [place] [address] Right  COLONOSCOPY  2004 and 2008    FOOT SURGERY Right     mortons     HYSTERECTOMY      KNEE SURGERY Left     replacement    RECTAL SURGERY      abcess gland    STOMACH SURGERY      tumor removed     Family History   Problem Relation Age of Onset    Other Sister         blood clots    Heart Attack Mother     Heart Disease Mother     Diabetes Mother     High Blood Pressure Mother     Heart Attack Father     Heart Disease Father     Diabetes Father     High Blood Pressure Father     Heart Disease Brother     Diabetes Brother     Heart Attack Brother 39    Breast Cancer Sister      Social History     Tobacco Use    Smoking status: Never Smoker    Smokeless tobacco: Never Used   Substance Use Topics    Alcohol use: No       Review of Systems    Constitutional    No fever or chills   HENT  no HA's, tinnitus, rhinorrhea, sore throat  Eyes  no sudden vision change or amaurosis. Respiratory  SOB on exertion. No chest pain  Cardiovascular  no chest pain, syncope, or significant dizziness. No palpitations   Gastrointestinal  no significant abdominal pain. No blood in stool. No diarrhea, nausea, or vomiting. Genitourinary  No difficulty urinating, dysuria, frequency, or urgency. No flank pain or hematuria. Musculoskeletal  no back pain or myalgia. Right knee pain with walking  Skin  no rashes or wounds   Neurologic  no dizziness, facial asymmetry, or light headedness. No seizures. No new onset of partial or complete loss of vision affecting only one eye, speech difficulty or lateralizing weakness, numbness/tingling   Hematologic  no excessive bleeding. Psychiatric  no severe anxiety or nervousness. No confusion. All other review of systems are negative. Physical Exam    /68 (Site: Left Upper Arm)   Pulse 88   Temp 96.2 °F (35.7 °C)   Resp 20   Ht 5' 2\" (1.575 m)   Wt 270 lb (122.5 kg)   SpO2 94%   BMI 49.38 kg/m²     Constitutional  No acute distress.   HENT  head normocephalic. Hearing is intact   Eyes  conjunctiva normal.  EOMS normal.  No exudate. No icterus. Neck- ROM appears normal, no tracheal deviation. Cardiovascular  Regular rate and rhythm. Heart sounds are normal.  Murmur noted. No rub, or gallop. Carotid pulses bilaterally with soft bruit versus radiation of heart tones. Extremities - Radial and ulnar pulses are 2+ to palpation bilaterally. No cyanosis, clubbing, or significant edema. No signs atheroembolic event. Pulmonary  effort appears normal.  No respiratory distress. Lungs - Breath sounds normal.   GI - Abdomen  No distension or palpable mass. Genitourinary  deferred. Musculoskeletal  ROM appears normal.  No significant edema. Neurologic  alert and oriented X 3. Face symmetric. No lateralizing weakness noted. Skin  warm, dry, and intact. No rash, erythema, or pallor. Psychiatric  mood, affect, and behavior appear normal.  Judgment and thought processes appear normal.    Risk factors for atherosclerosis of all vascular beds have been reviewed with the patient including:  Family history, tobacco abuse in all forms, elevated cholesterol, hyperlipidemia, and diabetes. Carotid Doppler results:    Right CCA/ICA <50% stenotic  Left CCA/ICA <50% stenotic  Right vertebral artery flow is antegrade  Left vertebral artery flow is antegrade  Individual velocities reviewed: Yes. Results were reviewed with the patient. Assessment      1. Bilateral carotid artery stenosis          Plan      Recommend she continue Plavix and Zetia daily  Recommend no smoking  Recommend good BP and glycemic control  Recommend low fat, low cholesterol diet  Recommend daily exercise in moderation   I explained to her that if she is having worsening shortness of breath with exertion she should notify her PCP. She verbalized understanding  We will follow-up in 12 months with a repeat carotid artery duplex scan.  Patient was instructed to go to ER or call office immediately with any new onset of partial or complete loss of vision affecting only one eye, speech difficulty or lateralizing weakness, numbness/tingling. Thank you for allowing us to participate in the care of your patient         Please note that parts of the chart were generated using Dragon dictation software. Although every effort was made to ensure the accuracy of this automated transcription, some errors in transcription may have occurred.

## 2022-02-28 RX ORDER — CLOPIDOGREL BISULFATE 75 MG/1
TABLET ORAL
Qty: 90 TABLET | Refills: 1 | Status: SHIPPED | OUTPATIENT
Start: 2022-02-28 | End: 2022-08-30

## 2022-04-25 ENCOUNTER — HOSPITAL ENCOUNTER (OUTPATIENT)
Dept: VASCULAR LAB | Age: 72
Discharge: HOME | End: 2022-04-25
Payer: MEDICARE

## 2022-04-25 ENCOUNTER — OFFICE VISIT (OUTPATIENT)
Dept: VASCULAR SURGERY | Age: 72
End: 2022-04-25
Payer: MEDICARE

## 2022-04-25 VITALS
SYSTOLIC BLOOD PRESSURE: 128 MMHG | DIASTOLIC BLOOD PRESSURE: 60 MMHG | HEART RATE: 74 BPM | RESPIRATION RATE: 18 BRPM | TEMPERATURE: 96.5 F

## 2022-04-25 DIAGNOSIS — I65.23 BILATERAL CAROTID ARTERY STENOSIS: Primary | ICD-10-CM

## 2022-04-25 DIAGNOSIS — I65.23 BILATERAL CAROTID ARTERY STENOSIS: ICD-10-CM

## 2022-04-25 PROCEDURE — 4040F PNEUMOC VAC/ADMIN/RCVD: CPT | Performed by: PHYSICIAN ASSISTANT

## 2022-04-25 PROCEDURE — 99213 OFFICE O/P EST LOW 20 MIN: CPT | Performed by: PHYSICIAN ASSISTANT

## 2022-04-25 PROCEDURE — G8400 PT W/DXA NO RESULTS DOC: HCPCS | Performed by: PHYSICIAN ASSISTANT

## 2022-04-25 PROCEDURE — 93880 EXTRACRANIAL BILAT STUDY: CPT

## 2022-04-25 PROCEDURE — 1123F ACP DISCUSS/DSCN MKR DOCD: CPT | Performed by: PHYSICIAN ASSISTANT

## 2022-04-25 PROCEDURE — 3017F COLORECTAL CA SCREEN DOC REV: CPT | Performed by: PHYSICIAN ASSISTANT

## 2022-04-25 PROCEDURE — 1036F TOBACCO NON-USER: CPT | Performed by: PHYSICIAN ASSISTANT

## 2022-04-25 PROCEDURE — 1090F PRES/ABSN URINE INCON ASSESS: CPT | Performed by: PHYSICIAN ASSISTANT

## 2022-04-25 PROCEDURE — G8427 DOCREV CUR MEDS BY ELIG CLIN: HCPCS | Performed by: PHYSICIAN ASSISTANT

## 2022-04-25 PROCEDURE — G8417 CALC BMI ABV UP PARAM F/U: HCPCS | Performed by: PHYSICIAN ASSISTANT

## 2022-04-25 NOTE — PROGRESS NOTES
Patient Care Team:  Milton Garcia MD as PCP - General (Internal Medicine)  Milton Garcia MD as PCP - REHABILITATION [facility] Provider  TEDDY Sue as Physician Assistant (Physician Assistant Medical)      History and Physical:    Polly Fiore is a 66 y.o. female who has a past medical history that includes obstructive sleep apnea with use of CPAP, diabetes, hyperlipidemia, hypertension, thyroid disease and carotid artery stenosis. Today we are following up for her carotid artery stenosis. Currently she is on Plavix 75 mg and Zetia 10 mg daily. She denies any new onset of partial or complete loss of vision affecting only one eye, speech difficulty or lateralizing weakness, numbness/tingling. Polly Fiore is a 66 y.o. female with the following history reviewed and recorded in [facility]:  Patient Active Problem List    Diagnosis Date Noted    Left carotid bruit 10/08/2021    Carotid artery stenosis, symptomatic, left 11/23/2020    Carotid stenosis, left     Obstructive sleep apnea     Restless leg syndrome     CPAP (continuous positive airway pressure) dependence      8cm       Current Outpatient Medications   Medication Sig Dispense Refill    clopidogrel (PLAVIX) 75 MG tablet TAKE 1 TABLET BY MOUTH EVERY DAY 90 tablet 1    verapamil (VERELAN) 240 MG extended release capsule Take 240 mg by mouth nightly      lisinopril (PRINIVIL;ZESTRIL) 20 MG tablet Take 20 mg by mouth daily      famotidine (PEPCID) 20 MG tablet Take 20 mg by mouth daily      senna-docusate (PERICOLACE) 8.6-50 MG per tablet Take 2 tablets by mouth daily      fluocinonide (LIDEX) 0.05 % cream Apply topically 2 times daily Apply topically 2 times daily.       ketoconazole (NIZORAL) 2 % cream Apply topically 2 times daily   3    triamcinolone (KENALOG) 0.1 % cream Apply topically 2 times daily   3    hydrALAZINE (APRESOLINE) 25 MG tablet Take 25 mg by mouth 2 times daily       allopurinol (ZYLOPRIM) 100 MG tablet Take 300 mg by mouth daily       docusate sodium (COLACE) 100 MG capsule Take 100 mg by mouth 4 times daily      furosemide (LASIX) 40 MG tablet Take 40 mg by mouth 2 times daily   3    cyanocobalamin 1000 MCG/ML injection Inject 1,000 mcg into the skin every 14 days   2    DULoxetine (CYMBALTA) 60 MG capsule Take 60 mg by mouth daily   3    ZETIA 10 MG tablet Take 10 mg by mouth daily   3    FREESTYLE LITE strip 1 ITEM DAILY TEST BLOOD SUGAR ONCE DAILY. DX:E11.9  3    levothyroxine (SYNTHROID) 125 MCG tablet Take 125 mcg by mouth Daily   2    metFORMIN (GLUCOPHAGE) 1000 MG tablet Take 1,000 mg by mouth 2 times daily (with meals) 1000mg BID  11    potassium chloride (MICRO-K) 10 MEQ CR capsule Take 10 mEq by mouth daily   3    propranolol (INDERAL) 10 MG tablet Take 20 mg by mouth 2 times daily   0    vitamin E 400 UNIT capsule Take 400 Units by mouth daily      CPAP Machine MISC by Does not apply route       No current facility-administered medications for this visit. Allergies: Latex, Ibuprofen, Asa [aspirin], Bee venom, Betadine [povidone iodine], Codeine, Donnatal [phenobarbital-belladonna alk], Fulvicin-p-g [griseofulvin], Tape [adhesive tape], Lortab [hydrocodone-acetaminophen], Pravastatin, and Wasp venom  Past Medical History:   Diagnosis Date    Acid reflux     Arthritis     CPAP (continuous positive airway pressure) dependence     8cm to 16cm    Diabetes mellitus (HCC)     Gout     Heart murmur     Hyperlipidemia     Hypertension     Obstructive sleep apnea     AHI:  20.5;  In REM AHI of 56.5 per PSG, 7/2010, c pap    Psoriasis     Restless leg syndrome     Thyroid disease      Past Surgical History:   Procedure Laterality Date    BREAST BIOPSY Left     CARDIAC CATHETERIZATION      CAROTID ENDARTERECTOMY Left 11/23/2020    LEFT CAROTID ENDARTERECTOMY performed by Sandra Dawn DO at [place] [id number] Right     COLONOSCOPY  2004 and 2008    FOOT SURGERY Right mortons     HYSTERECTOMY      KNEE SURGERY Left     replacement    RECTAL SURGERY      abcess gland    STOMACH SURGERY      tumor removed     Family History   Problem Relation Age of Onset    Other Sister         blood clots    Heart Attack Mother     Heart Disease Mother     Diabetes Mother     High Blood Pressure Mother     Heart Attack Father     Heart Disease Father     Diabetes Father     High Blood Pressure Father     Heart Disease Brother     Diabetes Brother     Heart Attack Brother 39    Breast Cancer Sister      Social History     Tobacco Use    Smoking status: Never Smoker    Smokeless tobacco: Never Used   Substance Use Topics    Alcohol use: No       Old records have been obtained from the referring provider. These records have been reviewed and summarized. Review of Systems    Constitutional -   No fever or chills   HENT - no HA's, tinnitus, rhinorrhea, sore throat  Eyes - no sudden vision change or amaurosis. Respiratory - no SOB or chest pain  Cardiovascular - no chest pain, syncope, or significant dizziness. No palpitations   Gastrointestinal - no significant abdominal pain. No blood in stool. No diarrhea, nausea, or vomiting. Genitourinary - No difficulty urinating, dysuria, frequency, or urgency. No flank pain or hematuria. Musculoskeletal - no back pain or myalgia. Skin - erythematous papular eruption on the right upper extremity. Neurologic - no dizziness, facial asymmetry, or light headedness. No seizures. No new onset of partial or complete loss of vision affecting only one eye, speech difficulty or lateralizing weakness, numbness/tingling   Hematologic - no excessive bleeding. Psychiatric - no severe anxiety or nervousness. No confusion. All other review of systems are negative. Physical Exam    /60 Comment: right  Pulse 74   Temp 96.5 °F (35.8 °C)   Resp 18     Constitutional - No acute distress. HENT - head normocephalic.   Hearing is intact   Eyes - conjunctiva normal.  EOMS normal.  No exudate. No icterus. Neck- ROM appears normal, no tracheal deviation. Cardiovascular - Regular rate and rhythm. Heart sounds are normal.  Murmur noted. No rub, or gallop. Carotid pulses bilaterally without bruit. Extremities - Radial and ulnar pulses are 2+ to palpation bilaterally. No cyanosis, clubbing, or significant edema. No signs atheroembolic event. Pulmonary - effort appears normal.  No respiratory distress. Lungs - Breath sounds normal.   GI - Abdomen - No distension or palpable mass. Genitourinary - deferred. Musculoskeletal - ROM appears normal.  No significant edema. Neurologic - alert and oriented X 3. Face symmetric. No lateralizing weakness noted. Skin - warm, dry, and intact. No rash, erythema, or pallor. Psychiatric - mood, affect, and behavior appear normal.  Judgment and thought processes appear normal.    Risk factors for atherosclerosis of all vascular beds have been reviewed with the patient including:  Family history, tobacco abuse in all forms, elevated cholesterol, hyperlipidemia, and diabetes. Carotid Doppler results:    Right CCA/ICA <50% stenotic  Left CCA/ICA <50% stenotic  Right vertebral artery flow is antegrade  Left vertebral artery flow is antegrade  Individual velocities reviewed: Yes. Results were reviewed with the patient. Assessment      1. Bilateral carotid artery stenosis          Plan      Recommend she continue Plavix 75 mg and Zetia 10 mg daily  Recommend no smoking  Recommend good BP and glycemic control  Recommend low fat, low cholesterol diet  Recommend daily exercise in moderation           Please note that parts of the chart were generated using Dragon dictation software. Although every effort was made to ensure the accuracy of this automated transcription, some errors in transcription may have occurred.

## 2022-08-30 RX ORDER — CLOPIDOGREL BISULFATE 75 MG/1
TABLET ORAL
Qty: 90 TABLET | Refills: 1 | Status: SHIPPED | OUTPATIENT
Start: 2022-08-30

## 2022-10-10 ENCOUNTER — OFFICE VISIT (OUTPATIENT)
Dept: NEUROLOGY | Age: 72
End: 2022-10-10
Payer: MEDICARE

## 2022-10-10 VITALS
SYSTOLIC BLOOD PRESSURE: 129 MMHG | OXYGEN SATURATION: 99 % | BODY MASS INDEX: 51.53 KG/M2 | DIASTOLIC BLOOD PRESSURE: 72 MMHG | HEART RATE: 72 BPM | HEIGHT: 62 IN | WEIGHT: 280 LBS

## 2022-10-10 DIAGNOSIS — Z99.89 CPAP (CONTINUOUS POSITIVE AIRWAY PRESSURE) DEPENDENCE: ICD-10-CM

## 2022-10-10 DIAGNOSIS — G25.81 RESTLESS LEG SYNDROME: ICD-10-CM

## 2022-10-10 DIAGNOSIS — G47.33 OBSTRUCTIVE SLEEP APNEA: Primary | ICD-10-CM

## 2022-10-10 PROCEDURE — G8400 PT W/DXA NO RESULTS DOC: HCPCS | Performed by: PHYSICIAN ASSISTANT

## 2022-10-10 PROCEDURE — G8417 CALC BMI ABV UP PARAM F/U: HCPCS | Performed by: PHYSICIAN ASSISTANT

## 2022-10-10 PROCEDURE — 3017F COLORECTAL CA SCREEN DOC REV: CPT | Performed by: PHYSICIAN ASSISTANT

## 2022-10-10 PROCEDURE — 99214 OFFICE O/P EST MOD 30 MIN: CPT | Performed by: PHYSICIAN ASSISTANT

## 2022-10-10 PROCEDURE — 1036F TOBACCO NON-USER: CPT | Performed by: PHYSICIAN ASSISTANT

## 2022-10-10 PROCEDURE — G8484 FLU IMMUNIZE NO ADMIN: HCPCS | Performed by: PHYSICIAN ASSISTANT

## 2022-10-10 PROCEDURE — G8427 DOCREV CUR MEDS BY ELIG CLIN: HCPCS | Performed by: PHYSICIAN ASSISTANT

## 2022-10-10 PROCEDURE — 1123F ACP DISCUSS/DSCN MKR DOCD: CPT | Performed by: PHYSICIAN ASSISTANT

## 2022-10-10 PROCEDURE — 1090F PRES/ABSN URINE INCON ASSESS: CPT | Performed by: PHYSICIAN ASSISTANT

## 2022-10-10 RX ORDER — DESONIDE 0.5 MG/G
OINTMENT TOPICAL
COMMUNITY
Start: 2022-08-17

## 2022-10-10 RX ORDER — VERAPAMIL HYDROCHLORIDE 240 MG/1
TABLET, FILM COATED, EXTENDED RELEASE ORAL
COMMUNITY
Start: 2022-08-07

## 2022-10-10 NOTE — LETTER
[facility]  [address] [place] [address] [address], [address] [place] [address]  Phone [phone]  Fax [phone]             Re:  Vinodne Shalonda    10/10/22  :  1950  Address: [address] [place] [address]       Replinishible PAP Supplies, 1 year supply  Item HPCPS Code Frequency   Mask of choice  or  1 per 3 months   Nasal Mask cushion/pillows [id number] or  2 per 30 days   Full Face Mask Interface  1 per 30 days   Headgear  1 per 6 months   Tubing, length of choice  or  1 per 3 months   Water Chamber  1 per 6 months   Chinstrap  1 per 6 months   Disposable Filters  2 per 30 days   Reusable Filters  1 per 6 months     Diagnoses:  Obstructive sleep apnea (G47.33)  Length of Need: Lifetime, 99    Ordering Provider: Keara Buckner PA-C  NPI:  [id number]        Signature: [unfilled]        Date: 10/10/2022      Electronically Signed by Keara Buckner PA-C  on 10/10/2022 at 10:16 AM

## 2022-10-10 NOTE — PATIENT INSTRUCTIONS
Patient education: Sleep apnea in adults       INTRODUCTION -- Normally during sleep, air moves through the throat and in and out of the lungs at a regular rhythm. In a person with sleep apnea, air movement is periodically diminished or stopped. There are two types of sleep apnea: obstructive sleep apnea and central sleep apnea. In obstructive sleep apnea, breathing is abnormal because of narrowing or closure of the throat. In central sleep apnea, breathing is abnormal because of a change in the breathing control and rhythm. Sleep apnea is a serious condition that can affect a person's ability to safely perform normal daily activities and can affect long term health. Approximately 25 percent of adults are at risk for sleep apnea of some degree. Men are more commonly affected than women. Other risk factors include middle and older age, being overweight or obese, and having a small mouth and throat. This topic review focuses on the most common type of sleep apnea in adults, obstructive sleep apnea (LENA). HOW SLEEP APNEA OCCURS -- The throat is surrounded by muscles that control the airway for speaking, swallowing, and breathing. During sleep, these muscles are less active, and this causes the throat to narrow. In most people, this narrowing does not affect breathing. In others, it can cause snoring, sometimes with reduced or completely blocked airflow. A completely blocked airway without airflow is called an obstructive apnea. Partial obstruction with diminished airflow is called a hypopnea. A person may have apnea and hypopnea during sleep. Insufficient breathing due to apnea or hypopnea causes oxygen levels to fall and carbon dioxide to rise. Because the airway is blocked, breathing faster or harder does not help to improve oxygen levels until the airway is reopened. Typically, the obstruction requires the person to awaken to activate the upper airway muscles.  Once the airway is opened, the person then older age adults. ?Male sex - LENA is two times more common in men, especially in middle age. ?Obesity - The more obese a person is, the more likely he or she is to have LENA. ? Sedation from medication or alcohol - This interferes with the ability to awaken from sleep and can lengthen periods of apnea (no breathing), with potentially dangerous consequences. ? Abnormality of the airway. SLEEP APNEA CONSEQUENCES -- Complications of sleep apnea can include daytime sleepiness and difficulty concentrating. The consequence of this is an increased risk of accidents and errors in daily activities. Studies have shown that people with severe LENA are more than twice as likely to be involved in a motor vehicle accident as people without these conditions. People with LENA are encouraged to discuss options for driving, working, and performing other high-risk tasks with a healthcare provider. In addition, people with untreated LENA may have an increased risk of cardiovascular problems such as high blood pressure, heart attack, abnormal heart rhythms, or stroke. This risk may be due to changes in the heart rate and blood pressure that occur during sleep. SLEEP APNEA DIAGNOSIS -- The diagnosis of LENA is best made by a knowledgeable sleep medicine specialist who has an understanding of the individual's health issues. The diagnosis is usually based upon the person's medical history, physical examination, and testing, including:  ? A complaint of snoring and ineffective sleep  ? Neck size (greater than 16 inches in men or 14 inches in women) is associated with an increased risk of sleep apnea  ? A small upper airway: difficulty seeing the throat because of a tongue that is large for the mouth  ? High blood pressure, especially if it is resistant to treatment  ? If a bed partner has observed the patient during episodes of stopped breathing (apnea), choking, or gasping during sleep, there is a strong possibility of sleep apnea. Testing is usually performed in a sleep laboratory. A full sleep study is called a polysomnogram. The polysomnogram measures the breathing effort and airflow, blood oxygen level, heart rate and rhythm, duration of the various stages of sleep, body position, and movement of the arms/legs. Home monitoring devices are available that can perform a sleep study. This is a reasonable alternative to conventional testing in a sleep laboratory if the clinician strongly suspects moderate or severe sleep apnea and the patient does not have other illnesses or sleep disorders that may interfere with the results. SLEEP APNEA TREATMENT -- Sleep apnea is best treated by a knowledgeable sleep medicine specialist. The goal of treatment is to maintain an open airway during sleep. Effective treatment will eliminate the symptoms of sleep disturbance; long-term health consequences are also reduced. Most treatments require nightly use. The challenge for the clinician and the patient is to select an effective therapy that is appropriate for the patient's problem and that is acceptable for long term use. Auto-titrating CPAP delivers an amount of PAP that varies during the night. The variation is dependent on event detection software algorithms, which will increase the pressure gradually in response to flow changes until adequate patency is detected. After a period of sustained upper airway patency, the delivered level of pressure gradually decreases until the algorithm identifies recurrent upper airway obstruction, at which point the delivered pressure again increases. The result is that the delivered pressure varies throughout the night, in an effort to provide the lowest pressure that is necessary to maintain upper airway patency. Continuous positive airway pressure (CPAP) -- The most effective treatment for sleep apnea uses air pressure from a mechanical device to keep the upper airway open during sleep.  A CPAP (continuous positive airway pressure)  device uses an air-tight attachment to the nose, typically a mask, connected to a tube and a blower which generates the pressure. Devices that fit comfortably into the nasal opening, rather than over the nose, are also available. CPAP should be used any time the person sleeps (day or night). The CPAP device is usually used for the first time in the sleep lab, where a technician can adjust the pressure and select the best equipment to keep the airway open. Alternatively, an auto device with a self-adjusting pressure feature, provided with proper education and training, can get treatment started without another sleep test. While the treatment may seem uncomfortable, noisy, or bulky at first, most people accept the treatment after experiencing better sleep. However, difficulty with mask comfort and nasal congestion prevent up to 50 percent of people from using the treatment on a regular basis. Continued follow up with a healthcare provider helps to ensure that the treatment is effective and comfortable. Information from the CPAP machine is often used by physicians, therapists, and insurers to track the success of treatment. CPAP can be delivered with different features to improve comfort and solve problems that may come up during treatment. Changes in treatment may be needed if symptoms do not improve or if the persons condition changes, such as a gain or loss of weight. Adjust sleep position -- Adjusting sleep position (to stay off the back) may help improve sleep quality in people who have LENA when sleeping on the back. However, this is difficult to maintain throughout the night and is rarely an adequate solution. Weight loss -- Weight loss may be helpful for obese or overweight patients. Weight loss may be accomplished with dietary changes, exercise, and/or surgical treatment.  However, it can be difficult to maintain weight loss; the five-year success of non-surgical weight loss is only 5 percent, meaning that 95 percent of people regain lost weight. Avoid alcohol and other sedatives -- Alcohol can worsen sleepiness, potentially increasing the risk of accidents or injury. People with LENA are often counseled to drink little to no alcohol, even during the daytime. Similarly, people who take anti-anxiety medications or sedatives to sleep should speak with their healthcare provider about the safety of these medications. People with LENA must notify all healthcare providers, including surgeons, about their condition and the potential risks of being sedated. People with LENA who are given anesthesia and/or pain medications require special management and close monitoring to reduce the risk of a blocked airway. Dental devices -- A dental device, called an oral appliance or mandibular advancement device, can reposition the jaw (mandible), bringing the tongue and soft palate forward as well. This may relieve obstruction in some people. This treatment is excellent for reducing snoring, although the effect on LENA is sometimes more limited. As a result, dental devices are best used for mild cases of LENA when relief of snoring is the main goal. Failure to tolerate and accept CPAP is another indication for dental devices. While dental devices are not as effective as CPAP for LENA, some patients prefer a dental device to CPAP. Side effects of dental devices are generally minor but may include changes to the bite with prolonged use. Surgical treatment -- Surgery is an alternative therapy for patients who cannot tolerate or do not improve with nonsurgical treatments such as CPAP or oral devices. Surgery can also be used in combination with other nonsurgical treatments. Surgical procedures reshape structures in the upper airways or surgically reposition bone or soft tissue. Uvulopalatopharyngoplasty (UPPP) removes the uvula and excessive tissue in the throat, including the tonsils, if present. Other procedures, such as maxillomandibular advancement (MMA), address both the upper and lower pharyngeal airway more globally. UPPP alone has limited success rates (less than 50 percent) and people can relapse (when LENA symptoms return after surgery). As a result, this surgery is only recommended in a minority of people and should be considered with caution. MMA may have a higher success rate, particularly in people with abnormal jaw (maxilla and mandible) anatomy, but it is the most complicated procedure. A newer surgical approach, nerve stimulation to protrude the tongue, has promising success rates in very selected people. Tracheostomy creates a permanent opening in the neck. It is reserved for people with severe disease in whom less drastic measures have failed or are inappropriate. Although it is always successful in eliminating obstructive sleep apnea, tracheostomy requires significant lifestyle changes and carries some serious risks (eg, infection, bleeding, blockage). All surgical treatments require discussions about the goals of treatment, the expected outcomes, and potential complications. Hypoglossal nerve stimulator- \"Inspire\" device    PAP treatment failure:  Possible causes of treatment failure include nonadherence or suboptimal adherence, weight gain, an inappropriate level of prescribed positive pressure, or an additional disorder causing sleepiness (eg, narcolepsy) that may require alterations in the therapeutic regimen. A review of medications should also be undertaken since many drugs may lead to sleepiness. Inadequate sleep time may also negate the expected effects from treatment of LENA. Also, pt's can have persistent hypersomnolence associated with sleep apnea even in the presence of adequate therapy and at those times Provigil or Nuvigil or other stimulants may be indicated.     Once the patient's positive airway pressure therapy has been optimized and symptoms resolved, a regimen of long-term follow-up should be established. Annual visits are reasonable, with more frequent visits in between if new issues arise. The purpose of long-term follow-up is to assess usage and monitor for recurrent LENA, new side effects, air leakage, and fluctuations in body weight. WHERE TO GET MORE INFORMATION -- Your healthcare provider is the best source of information for questions and concerns related to your medical problem. Organizations  [employer]  Provides information about sleep apnea to the public, publishes a newsletter, and serves as an advocate for people with the disorder. Renetta, [address] [facility], [address] [facility]   [email or web address]   [email or web address]   Tel: [phone]   Fax: [facility] that works to [employer] and safety by promoting public understanding of sleep and sleep disorders. Supports sleep-related education, research, and advocacy; produces and distributes educational materials to the public and healthcare professionals; and offers postdoctoral fellowships and grants for sleep researchers. [address]ately[address]   [email or web address]   [email or web address]   Tel: [phone]   Fax: [phone]    Important information:  Medicare/private insurance CPAP/BiPAP/APAP requirements:  Medicare/private insurance has specific requirements for PAP compliance that must be met during the first 90 days of use to continue coverage for CPAP/BiPAP/APAP  from day 91 and beyond. The policy requires that patients use a PAP device 4 hours per 24 hour period, at least 70% of the time over a 30 day period. This data must be downloaded as a report direct from the PAP devices. This is called a compliance download. Your PAP supplier will assist you in this matter.      Note:  Where applicable, we will utilize PAP device efficiency reports, additional testing, and face-to-face  clinical evaluation subsequent to any treatment, changes in treatment, and continued treatment. Caution:  Please abstain from driving or engaging in other activities which may be hazardous in the presence of diminished alertness or daytime drowsiness. And avoid the use of sedatives or alcohol, which can worsen sleep apnea and daytime drowsiness. Mask suggestions:  -     Resmed Airfit N20 (Nasal) or F20 (Full face mask). They conform to your face, thus decreasing the potential for mask leakage. You might like the AirTouch F20(full face mask). It has a \"memory foam\" like cushion. The AirFit F30 is a smaller style full face mask designed to sit low on and cover less of your face for fewer facial marks. AirFit N30i has a top of the head tube with a nasal mask. AirFit P10 reported to be the most comfortable nasal pillow mask. Resmed Mirage FX reported to be the most comfortable nasal mask. Resmed Mirage Fairchance reported to be the most comfortable hybrid mask. AirTouch N20-memory foam nasal mask. Respironics: You might also like to try a nasal mask called a Dreamwear nasal mask or the Dreamwear nasal pillow. Another suggestion is the [facility], it is a minimal contact full face mask. The [place] incredible under the nose design makes it the only full face mask that won't cause red marks on the bridge of your nose when compared to other full face masks. The Dreamwear full face mask has a  soft feel, unique in-frame air-flow, and innovative air tube connection at the top of the head for the ultimate in sleep comfort. Comfort Gel Blue. Dreamwear gel pillows. [employer]: Brevida nasal pillow mask and Simplus FFM    The use of a memory foam CPAP pillow supports the head and neck throughout the night.

## 2022-10-10 NOTE — PROGRESS NOTES
[facility]  [address] [place] [address] Drive, [address]  [address] [facility], [facility] [address]  Phone [phone]  Fax [phone]       [facility] Sleep Follow Up Encounter      Information:   Patient Name: Kodak Birch  :   1950  Age:   66 y.o. MRN:   [id number]  Account #:  [de-identified]  Today:                10/10/22    Provider:  Africa Duffy PA-C    Chief Complaint   Patient presents with    [employer]        Subjective:   Kodak Birch is a 66 y.o. female  with a history of severe LENA, RLS, carotid stenosis, and heart murmur  who comes in for a sleep clinic follow up. The PSG, 2010 revealed an AHI of 20.5; in REM AHI of 56.5. She is prescribed auto CPAP with a pressure range of 8cm to 16cm. She has a Zara Respironics device. It has been registered. She is waiting on the replacement. The compliance report indicates that she is averaging 7 hours of CPAP use per day. She reports that consistent CPAP use has alleviated the previous LENA symptoms. The RLS is stable. Location or symptom:  LENA  Onset:  PS  Timing:  q hs  Severity:  Severe  Associated:  Snoring, witnessed apneas, and excessive daytime somnolence  Alleviated:  CPAP (2nd CPAP, 2018)    Objective:     Past Medical History:   Diagnosis Date    Acid reflux     Arthritis     CPAP (continuous positive airway pressure) dependence     8cm to 16cm    Diabetes mellitus (HCC)     Gout     Heart murmur     Hyperlipidemia     Hypertension     Obstructive sleep apnea     AHI:  20.5;  In REM AHI of 56.5 per PSG, 2010, c pap    Psoriasis     Restless leg syndrome     Thyroid disease        Past Surgical History:   Procedure Laterality Date    BREAST BIOPSY Left     CARDIAC CATHETERIZATION      CAROTID ENDARTERECTOMY Left 2020    LEFT CAROTID ENDARTERECTOMY performed by Alfa Lacey DO at [facility] Right     COLONOSCOPY   and     FOOT SURGERY Right     [facility]     HYSTERECTOMY (CERVIX STATUS UNKNOWN) KNEE SURGERY Left     replacement    RECTAL SURGERY      abcess gland    STOMACH SURGERY      tumor removed       Recent Hospitalizations      Significant Injuries      Family History   Problem Relation Age of Onset    Other Sister         blood clots    Heart Attack Mother     Heart Disease Mother     Diabetes Mother     High Blood Pressure Mother     Heart Attack Father     Heart Disease Father     Diabetes Father     High Blood Pressure Father     Heart Disease Brother     Diabetes Brother     Heart Attack Brother 39    Breast Cancer Sister        Social History  Social History     Tobacco Use   Smoking Status Never   Smokeless Tobacco Never     Social History     Substance and Sexual Activity   Alcohol Use No     Social History     Substance and Sexual Activity   Drug Use No         Current Outpatient Medications   Medication Sig Dispense Refill    desonide (DESOWEN) 0.05 % ointment APPLY TWICE DAILY TO RASH ON THE FACE      verapamil (CALAN SR) 240 MG extended release tablet TAKE 1 TABLET BY MOUTH EVERY DAY      clopidogrel (PLAVIX) 75 MG tablet TAKE 1 TABLET BY MOUTH EVERY DAY 90 tablet 1    lisinopril (PRINIVIL;ZESTRIL) 20 MG tablet Take 20 mg by mouth daily      famotidine (PEPCID) 20 MG tablet Take 20 mg by mouth daily      senna-docusate (PERICOLACE) 8.6-50 MG per tablet Take 2 tablets by mouth daily      fluocinonide (LIDEX) 0.05 % cream Apply topically 2 times daily Apply topically 2 times daily.       ketoconazole (NIZORAL) 2 % cream Apply topically 2 times daily   3    triamcinolone (KENALOG) 0.1 % cream Apply topically 2 times daily   3    hydrALAZINE (APRESOLINE) 25 MG tablet Take 25 mg by mouth 2 times daily       allopurinol (ZYLOPRIM) 100 MG tablet Take 300 mg by mouth daily       docusate sodium (COLACE) 100 MG capsule Take 100 mg by mouth 4 times daily      furosemide (LASIX) 40 MG tablet Take 40 mg by mouth 2 times daily   3    DULoxetine (CYMBALTA) 60 MG capsule Take 60 mg by mouth daily 3    ZETIA 10 MG tablet Take 10 mg by mouth daily   3    FREESTYLE LITE strip 1 ITEM DAILY TEST BLOOD SUGAR ONCE DAILY. DX:E11.9  3    levothyroxine (SYNTHROID) 125 MCG tablet Take 125 mcg by mouth Daily   2    metFORMIN (GLUCOPHAGE) 1000 MG tablet Take 1,000 mg by mouth 2 times daily (with meals) 1000mg BID  11    potassium chloride (MICRO-K) 10 MEQ CR capsule Take 10 mEq by mouth daily   3    propranolol (INDERAL) 10 MG tablet Take 20 mg by mouth 2 times daily   0    vitamin E 400 UNIT capsule Take 400 Units by mouth daily      CPAP Machine MISC by Does not apply route      cyanocobalamin 1000 MCG/ML injection Inject 1,000 mcg into the skin every 14 days  (Patient not taking: Reported on 10/10/2022)  2     No current facility-administered medications for this visit.        Allergies:  Latex, Ibuprofen, Asa [aspirin], Bee venom, Betadine [povidone iodine], Codeine, Donnatal [phenobarbital-belladonna alk], Fulvicin-p-g [griseofulvin], Tape [adhesive tape], Lortab [hydrocodone-acetaminophen], Pravastatin, and Wasp venom    REVIEW OF SYSTEMS     Constitutional: []Fever []Sweats []Chills [] Recent Injury   [x] Denies all unless marked  HENT:[]Headache  [] Head Injury  [] Sore Throat  [] Ear Pain  [] Dizziness [] Hearing Loss   [x] Denies all unless marked  Musculoskeletal: [] Arthralgia  [] Myalgias [] Muscle cramps  [] Muscle twitches   [x] Denies all unless marked   Spine:  [] Neck pain  [] Back pain  [] Sciaticia  [x] Denies all unless marked  Neurological:[] Visual Disturbance [] Double Vision [] Slurred Speech [] Trouble swallowing  [] Vertigo [] Tingling [] Numbness [] Weakness [] Loss of Balance   [] Loss of Consciousness [] Memory Loss [] Seizures  [x] Denies all unless marked  Psychiatric/Behavioral:[] Depression [] Anxiety  [x] Denies all unless marked  Sleep: []  Insomnia [] Sleep Disturbance [] Snoring [x] Restless Legs [] Daytime Sleepiness [x] [employer]  [] Denies all unless marked    The MA has completed the ROS with the patient. I have reviewed it in its' entirety with the patient and agree with the documentation. PHYSICAL EXAM  /72   Pulse 72   Ht 5' 2\" (1.575 m)   Wt 280 lb (127 kg)   SpO2 99%   BMI 51.21 kg/m²     Constitutional -  Alert in NAD, well developed, pleasant and cooperative with exam  HEENT- Conjunctiva normal. Diffuse rash noted, hearing intact, no neck masses noted, no jugular vein distension, no bruit  Cardiac- Regular rate and rhythm; Grade I/VI murmur (previously dx)  Pulmonary- Clear to auscultation, good expansion, normal effort without use of accessory muscles  Musculoskeletal - No significant wasting of muscles noted. No bony deformities  Extremities - No clubbing, cyanosis or edema  Skin - Warm, dry, and intact. No rash, erythema, or pallor  Psychiatric - Mood, affect, and behavior appear normal      Neurological exam  Awake, alert, fluent oriented  appropriate affect  Attention and concentration appear appropriate  Recent and remote memory appears unremarkable  Speech normal without dysarthria  No clear issues with language of fund of knowledge    Cranial Nerve Exam     CN III, IV,VI-EOMI, No nystagmus, conjugate eye movements, no ptosis  CN VII-No facial assymetry    Motor Exam    Antigravity throughout upper and lower extremities bilaterally    Tremors and coordination    No tremors in hands or head noted     Gait    Normal base and speed  No ataxia    I reviewed the following studies:       []  :  Clinical laboratory test results     []  :  Radiology reports                    [x]  :  Review and summarization of medical records-compliance report      []     Request for medical records       []  :  Reviewed previous/recent polysomnogram report(s)      []  :  [place] Sleepiness Scale       [x]  :  Compliance report :  The auto CPAP is set at a pressure of 8cm to 16cm.  Compliance download shows that she uses device: 100% of the time;  percentage of days with usage >=4 hours: 100%. AHI: 1.3    Assessment:       ICD-10-CM    1. Obstructive sleep apnea  G47.33       2. Restless leg syndrome  G25.81       3. CPAP (continuous positive airway pressure) dependence  Z99.89              []  :  Stable     []  :  Improved                       [x]  :  Well controlled              []  :  Resolving     []  :  Resolved     []  :  Inadequately controlled     []  :  Worsening     []  :  Additional workup planned    Megan Beck is compliant and benefiting from therapy as indicated by compliance evaluation and patient report. Plan:     No orders of the defined types were placed in this encounter. 1.   Previously advised of the etiology,  pathophysiology, diagnosis, treatment options, and risks of untreated LENA. Risks may include, but are not limited to  hypertension, coronary artery disease, atrial fibrillation, CHF, diabetes, stroke, weight gain, impaired cognition, daytime somnolence, and motor vehicle accidents. Advised to abstain from driving or operating heavy machinery when drowsy and the use of respiratory suppressants. Discussed diagnostic studies and potential treatment plan. 2.  Will evaluate for PAP clinical benefit and and compliance during a 30 day period within the preceding 90 days PRN. 3.  The following educational material has been included in this visit after visit summary for your review: LENA/PAP guidelines-Discussed with the patient and all questions fully answered. 4.  Continue PAP therapy as ordered by Dr. Jacki Pelletier. Follow up with [facility] to see the status of the replacement. The patient voices understanding and recognizes the need for adherence to the prescribed therapy; consider a repeat PSG  5. Order-supplies-[facility]  6.   Follow up in 1 year     Replinishible PAP Supplies, 1 year supply  Item HPCPS Code Frequency   Mask of choice  or  1 per 3 months   Nasal Mask cushion/pillows  or  2 per 30 days   Full Face Mask Interface  1 per 30 days   Headgear  1 per 6 months   Tubing, length of choice  or  1 per 3 months   Water Chamber  1 per 6 months   Chinstrap  1 per 6 months   Disposable Filters  2 per 30 days   Reusable Filters  1 per 6 months     Diagnoses:  Obstructive sleep apnea (G47.33)  Length of Need: Lifetime, 99    Ordering Provider: Jc Smith PA-C  NPI:  [id number]

## 2022-11-21 ENCOUNTER — OFFICE VISIT (OUTPATIENT)
Dept: VASCULAR SURGERY | Age: 72
End: 2022-11-21
Payer: MEDICARE

## 2022-11-21 ENCOUNTER — HOSPITAL ENCOUNTER (OUTPATIENT)
Dept: VASCULAR LAB | Age: 72
Discharge: HOME | End: 2022-11-21
Payer: MEDICARE

## 2022-11-21 VITALS
HEART RATE: 67 BPM | OXYGEN SATURATION: 98 % | SYSTOLIC BLOOD PRESSURE: 112 MMHG | DIASTOLIC BLOOD PRESSURE: 55 MMHG | TEMPERATURE: 97.7 F

## 2022-11-21 DIAGNOSIS — I65.23 BILATERAL CAROTID ARTERY STENOSIS: Primary | ICD-10-CM

## 2022-11-21 DIAGNOSIS — I65.23 BILATERAL CAROTID ARTERY STENOSIS: ICD-10-CM

## 2022-11-21 PROCEDURE — 1123F ACP DISCUSS/DSCN MKR DOCD: CPT | Performed by: PHYSICIAN ASSISTANT

## 2022-11-21 PROCEDURE — G8400 PT W/DXA NO RESULTS DOC: HCPCS | Performed by: PHYSICIAN ASSISTANT

## 2022-11-21 PROCEDURE — G8484 FLU IMMUNIZE NO ADMIN: HCPCS | Performed by: PHYSICIAN ASSISTANT

## 2022-11-21 PROCEDURE — 3017F COLORECTAL CA SCREEN DOC REV: CPT | Performed by: PHYSICIAN ASSISTANT

## 2022-11-21 PROCEDURE — 99213 OFFICE O/P EST LOW 20 MIN: CPT | Performed by: PHYSICIAN ASSISTANT

## 2022-11-21 PROCEDURE — 93880 EXTRACRANIAL BILAT STUDY: CPT

## 2022-11-21 PROCEDURE — G8427 DOCREV CUR MEDS BY ELIG CLIN: HCPCS | Performed by: PHYSICIAN ASSISTANT

## 2022-11-21 PROCEDURE — G8417 CALC BMI ABV UP PARAM F/U: HCPCS | Performed by: PHYSICIAN ASSISTANT

## 2022-11-21 PROCEDURE — 1090F PRES/ABSN URINE INCON ASSESS: CPT | Performed by: PHYSICIAN ASSISTANT

## 2022-11-21 PROCEDURE — 1036F TOBACCO NON-USER: CPT | Performed by: PHYSICIAN ASSISTANT

## 2022-11-21 RX ORDER — GLIMEPIRIDE 4 MG/1
4 TABLET ORAL
COMMUNITY

## 2022-11-21 RX ORDER — CLOPIDOGREL BISULFATE 75 MG/1
TABLET ORAL
Qty: 90 TABLET | Refills: 3 | Status: SHIPPED | OUTPATIENT
Start: 2022-11-21

## 2022-11-21 NOTE — PROGRESS NOTES
Patient Care Team:  Ray Pittman MD as PCP - General (Internal Medicine)  Ray Pittman MD as PCP - [facility]  TEDDY Sawyer as Physician Assistant (Physician Assistant Medical)      History and Physical:    Mina Willingham is a 67 y.o. female who has a past medical history that includes reflux, diabetes, obstructive sleep apnea with use of CPAP, hyperlipidemia, hypertension, thyroid disease and carotid artery stenosis, status post left carotid endarterectomy. Today we are following up for her carotid artery stenosis. Currently she is on Plavix 75 mg and Zetia 10 mg daily. She reports her last A1c was above 8 and her Amaryl has been increased from 4 mg to 8 mg. BP stable. She denies any new onset of partial or complete loss of vision affecting only one eye, speech difficulty or lateralizing weakness, numbness/tingling. She does not smoke. Mina Willingham is a 67 y.o. female with the following history reviewed and recorded in [facility]:  Patient Active Problem List    Diagnosis Date Noted    Left carotid bruit 10/08/2021    Carotid artery stenosis, symptomatic, left 11/23/2020    Carotid stenosis, left     Obstructive sleep apnea     Restless leg syndrome     CPAP (continuous positive airway pressure) dependence      8cm       Current Outpatient Medications   Medication Sig Dispense Refill    glimepiride (AMARYL) 4 MG tablet Take 4 mg by mouth every morning (before breakfast) Patient started this med 3 mo ago.       verapamil (CALAN SR) 240 MG extended release tablet TAKE 1 TABLET BY MOUTH EVERY DAY      clopidogrel (PLAVIX) 75 MG tablet TAKE 1 TABLET BY MOUTH EVERY DAY 90 tablet 1    lisinopril (PRINIVIL;ZESTRIL) 20 MG tablet Take 20 mg by mouth daily      famotidine (PEPCID) 20 MG tablet Take 20 mg by mouth daily      senna-docusate (PERICOLACE) 8.6-50 MG per tablet Take 2 tablets by mouth daily      ketoconazole (NIZORAL) 2 % cream Apply topically 2 times daily   3 triamcinolone (KENALOG) 0.1 % cream Apply topically 2 times daily   3    hydrALAZINE (APRESOLINE) 25 MG tablet Take 25 mg by mouth 2 times daily       allopurinol (ZYLOPRIM) 100 MG tablet Take 300 mg by mouth daily       docusate sodium (COLACE) 100 MG capsule Take 100 mg by mouth 4 times daily      furosemide (LASIX) 40 MG tablet Take 40 mg by mouth 2 times daily   3    DULoxetine (CYMBALTA) 60 MG capsule Take 60 mg by mouth daily   3    ZETIA 10 MG tablet Take 10 mg by mouth daily   3    levothyroxine (SYNTHROID) 125 MCG tablet Take 125 mcg by mouth Daily   2    metFORMIN (GLUCOPHAGE) 1000 MG tablet Take 1,000 mg by mouth 2 times daily (with meals) 1000mg BID  11    potassium chloride (MICRO-K) 10 MEQ CR capsule Take 10 mEq by mouth daily   3    propranolol (INDERAL) 10 MG tablet Take 20 mg by mouth 2 times daily   0    vitamin E 400 UNIT capsule Take 400 Units by mouth daily      CPAP Machine MISC by Does not apply route      desonide (DESOWEN) 0.05 % ointment APPLY TWICE DAILY TO RASH ON THE FACE      fluocinonide (LIDEX) 0.05 % cream Apply topically 2 times daily Apply topically 2 times daily. cyanocobalamin 1000 MCG/ML injection Inject 1,000 mcg into the skin every 14 days  (Patient not taking: Reported on 10/10/2022)  2    FREESTYLE LITE strip 1 ITEM DAILY TEST BLOOD SUGAR ONCE DAILY. DX:E11.9  3     No current facility-administered medications for this visit. Allergies: Latex, Ibuprofen, Asa [aspirin], Bee venom, Betadine [povidone iodine], Codeine, Donnatal [phenobarbital-belladonna alk], Fulvicin-p-g [griseofulvin], Tape [adhesive tape], Lortab [hydrocodone-acetaminophen], Pravastatin, and Wasp venom  Past Medical History:   Diagnosis Date    Acid reflux     Arthritis     CPAP (continuous positive airway pressure) dependence     8cm to 16cm    Diabetes mellitus (HCC)     Gout     Heart murmur     Hyperlipidemia     Hypertension     Obstructive sleep apnea     AHI:  20.5;  In REM AHI of 56.5 per PSG, 7/2010, c pap    Psoriasis     Restless leg syndrome     Thyroid disease      Past Surgical History:   Procedure Laterality Date    BREAST BIOPSY Left     CARDIAC CATHETERIZATION      CAROTID ENDARTERECTOMY Left 11/23/2020    LEFT CAROTID ENDARTERECTOMY performed by Darryle Laughter, DO at [facility] Right     COLONOSCOPY  2004 and 2008    FOOT SURGERY Right     [facility]     HYSTERECTOMY (CERVIX STATUS UNKNOWN)      KNEE SURGERY Left     replacement    RECTAL SURGERY      abcess gland    STOMACH SURGERY      tumor removed     Family History   Problem Relation Age of Onset    Other Sister         blood clots    Heart Attack Mother     Heart Disease Mother     Diabetes Mother     High Blood Pressure Mother     Heart Attack Father     Heart Disease Father     Diabetes Father     High Blood Pressure Father     Heart Disease Brother     Diabetes Brother     Heart Attack Brother 39    Breast Cancer Sister      Social History     Tobacco Use    Smoking status: Never    Smokeless tobacco: Never   Substance Use Topics    Alcohol use: No       Review of Systems    Constitutional -   No fever or chills   HENT - no HA's, tinnitus, rhinorrhea, sore throat  Eyes - no sudden vision change or amaurosis. Respiratory - no SOB or chest pain  Cardiovascular - no chest pain, syncope, or significant dizziness. No palpitations   Gastrointestinal - no significant abdominal pain. No blood in stool. No diarrhea, nausea, or vomiting. Genitourinary - No difficulty urinating, dysuria, frequency, or urgency. No flank pain or hematuria. Musculoskeletal - no back pain or myalgia. Skin - rash face, neck trunk and UE and has seen dermatology and was diagnosed with eczema  Neurologic - no dizziness, facial asymmetry, or light headedness. No seizures.   No new onset of partial or complete loss of vision affecting only one eye, speech difficulty or lateralizing weakness, numbness/tingling   Hematologic - no excessive bleeding. Psychiatric - no severe anxiety or nervousness. No confusion. All other review of systems are negative. Physical Exam    BP (!) 112/55 (Site: Right Lower Arm, Position: Sitting, Cuff Size: Large Adult)   Pulse 67   Temp 97.7 °F (36.5 °C)   SpO2 98%     Constitutional - No acute distress. HENT - head normocephalic. Hearing is intact   Eyes - conjunctiva normal.  EOMS normal.  No exudate. No icterus. Neck- ROM appears normal, no tracheal deviation. Well-healed surgical scar left neck. Cardiovascular - Regular rate and rhythm. Heart sounds are normal.  Murmur noted. No rub, or gallop. Carotid pulses bilaterally with bruit versus radiation of heart tones. Extremities - Radial and ulnar pulses are 2+ to palpation bilaterally. No cyanosis, clubbing, or significant edema. No signs atheroembolic event. Pulmonary - effort appears normal.  No respiratory distress. Lungs - Breath sounds normal.   GI - Abdomen - No distension or palpable mass. Genitourinary - deferred. Musculoskeletal - ROM appears normal.  No significant edema. Neurologic - alert and oriented X 3. Face symmetric. No lateralizing weakness noted. Skin - warm, dry, and intact. Erythematous eczematous eruption noted face and neck. Psychiatric - mood, affect, and behavior appear normal.  Judgment and thought processes appear normal.    Risk factors for atherosclerosis of all vascular beds have been reviewed with the patient including:  Family history, tobacco abuse in all forms, elevated cholesterol, hyperlipidemia, and diabetes. Carotid Doppler results: 11/21/22    Right CCA/ICA <50% stenotic  Left CCA/ICA <50% stenotic  Right vertebral artery flow is antegrade  Left vertebral artery flow is antegrade  Individual velocities reviewed: Yes. Results were reviewed with the patient. Assessment      1.  Bilateral carotid artery stenosis          Plan      Recommend she continue Zetia 10 mg and Plavix 75 mg daily  Recommend no smoking  Recommend good BP and glycemic control  Recommend low fat, low cholesterol diet  Recommend daily exercise in moderation   We will follow-up in 1 year with a repeat carotid artery duplex scan. Patient was instructed to go to ER or call office immediately with any new onset of partial or complete loss of vision affecting only one eye, speech difficulty or lateralizing weakness, numbness/tingling           Please note that parts of the chart were generated using Dragon dictation software. Although every effort was made to ensure the accuracy of this automated transcription, some errors in transcription may have occurred.

## 2023-03-03 ENCOUNTER — TRANSCRIBE ORDERS (OUTPATIENT)
Dept: ADMINISTRATIVE | Facility: HOSPITAL | Age: 73
End: 2023-03-03
Payer: MEDICARE

## 2023-03-03 ENCOUNTER — HOSPITAL ENCOUNTER (OUTPATIENT)
Dept: PREOP | Facility: HOSPITAL | Age: 73
Discharge: HOME | End: 2023-03-03
Payer: MEDICARE

## 2023-03-03 ENCOUNTER — HOSPITAL ENCOUNTER (OUTPATIENT)
Dept: GENERAL RADIOLOGY | Facility: HOSPITAL | Age: 73
Discharge: HOME | End: 2023-03-03
Payer: MEDICARE

## 2023-03-03 DIAGNOSIS — N18.4 CHRONIC KIDNEY DISEASE, STAGE 4 (SEVERE): ICD-10-CM

## 2023-03-03 DIAGNOSIS — R06.00 DYSPNEA, UNSPECIFIED TYPE: Primary | ICD-10-CM

## 2023-03-03 DIAGNOSIS — J40 BRONCHITIS, NOT SPECIFIED AS ACUTE OR CHRONIC: ICD-10-CM

## 2023-03-03 DIAGNOSIS — N18.4 CHRONIC KIDNEY DISEASE, STAGE 4 (SEVERE): Primary | ICD-10-CM

## 2023-03-03 PROCEDURE — 71046 X-RAY EXAM CHEST 2 VIEWS: CPT

## 2023-03-03 PROCEDURE — 51798 US URINE CAPACITY MEASURE: CPT

## 2023-03-06 ENCOUNTER — TELEPHONE (OUTPATIENT)
Dept: NEUROLOGY | Age: 73
End: 2023-03-06

## 2023-03-06 NOTE — TELEPHONE ENCOUNTER
Pt called and states ever since she received her new c-pap machine in October she has been having trouble breathing sometimes during the night. She also states water comes out of the machine into the hose. She states it always wakes her up and it feels like a nose bleed. Pt asked for advice. I told pt she should contact the medical supply store she got it from and they will more than likely want her to bring it to them to access it. She thought there was something Phoebe Lev might could do but I told her if the machine is malfunctioning she would have to speak with the company. Pt states understanding.

## 2023-03-10 ENCOUNTER — HOSPITAL ENCOUNTER (OUTPATIENT)
Dept: CARDIOLOGY | Facility: HOSPITAL | Age: 73
Discharge: HOME | End: 2023-03-10
Admitting: INTERNAL MEDICINE
Payer: MEDICARE

## 2023-03-10 DIAGNOSIS — R06.00 DYSPNEA, UNSPECIFIED TYPE: ICD-10-CM

## 2023-03-10 PROCEDURE — 93306 TTE W/DOPPLER COMPLETE: CPT | Performed by: INTERNAL MEDICINE

## 2023-03-10 PROCEDURE — 25510000001 PERFLUTREN PROTEIN A MICROSPH SUSPENSION: Performed by: INTERNAL MEDICINE

## 2023-03-10 PROCEDURE — 93306 TTE W/DOPPLER COMPLETE: CPT

## 2023-03-10 RX ADMIN — HUMAN ALBUMIN MICROSPHERES AND PERFLUTREN 0.44 MG: 10; .22 INJECTION, SOLUTION INTRAVENOUS at 13:47

## 2023-03-13 LAB
BH CV ECHO MEAS - AO MAX PG: 25.4 MMHG
BH CV ECHO MEAS - AO MEAN PG: 10.2 MMHG
BH CV ECHO MEAS - AO ROOT DIAM: 2.6 CM
BH CV ECHO MEAS - AO V2 MAX: 252 CM/SEC
BH CV ECHO MEAS - AO V2 VTI: 41.4 CM
BH CV ECHO MEAS - AVA(I,D): 1.51 CM2
BH CV ECHO MEAS - EDV(CUBED): 107.2 ML
BH CV ECHO MEAS - EDV(MOD-SP4): 93.3 ML
BH CV ECHO MEAS - EF(MOD-SP4): 71.4 %
BH CV ECHO MEAS - ESV(CUBED): 33.7 ML
BH CV ECHO MEAS - ESV(MOD-SP4): 26.7 ML
BH CV ECHO MEAS - FS: 32 %
BH CV ECHO MEAS - IVS/LVPW: 0.73 CM
BH CV ECHO MEAS - IVSD: 0.8 CM
BH CV ECHO MEAS - LA DIMENSION: 3.8 CM
BH CV ECHO MEAS - LAT PEAK E' VEL: 7.8 CM/SEC
BH CV ECHO MEAS - LV DIASTOLIC VOL/BSA (35-75): 43.2 CM2
BH CV ECHO MEAS - LV MASS(C)D: 156.2 GRAMS
BH CV ECHO MEAS - LV MAX PG: 3.8 MMHG
BH CV ECHO MEAS - LV MEAN PG: 2 MMHG
BH CV ECHO MEAS - LV SYSTOLIC VOL/BSA (12-30): 12.4 CM2
BH CV ECHO MEAS - LV V1 MAX: 97.3 CM/SEC
BH CV ECHO MEAS - LV V1 VTI: 22.1 CM
BH CV ECHO MEAS - LVIDD: 4.8 CM
BH CV ECHO MEAS - LVIDS: 3.2 CM
BH CV ECHO MEAS - LVOT AREA: 2.8 CM2
BH CV ECHO MEAS - LVOT DIAM: 1.9 CM
BH CV ECHO MEAS - LVPWD: 1.1 CM
BH CV ECHO MEAS - MED PEAK E' VEL: 5.4 CM/SEC
BH CV ECHO MEAS - MR MAX PG: 103.9 MMHG
BH CV ECHO MEAS - MR MAX VEL: 509 CM/SEC
BH CV ECHO MEAS - MR MEAN PG: 64.5 MMHG
BH CV ECHO MEAS - MR MEAN VEL: 376.5 CM/SEC
BH CV ECHO MEAS - MR VTI: 161 CM
BH CV ECHO MEAS - MV A MAX VEL: 117 CM/SEC
BH CV ECHO MEAS - MV DEC SLOPE: 745 CM/SEC2
BH CV ECHO MEAS - MV DEC TIME: 0.15 MSEC
BH CV ECHO MEAS - MV E MAX VEL: 114 CM/SEC
BH CV ECHO MEAS - MV E/A: 0.97
BH CV ECHO MEAS - MV P1/2T: 52.8 MSEC
BH CV ECHO MEAS - MVA(P1/2T): 4.2 CM2
BH CV ECHO MEAS - PA V2 MAX: 111 CM/SEC
BH CV ECHO MEAS - RAP SYSTOLE: 5 MMHG
BH CV ECHO MEAS - RVSP: 64 MMHG
BH CV ECHO MEAS - SI(MOD-SP4): 30.9 ML/M2
BH CV ECHO MEAS - SV(LVOT): 62.7 ML
BH CV ECHO MEAS - SV(MOD-SP4): 66.6 ML
BH CV ECHO MEAS - TR MAX PG: 59 MMHG
BH CV ECHO MEAS - TR MAX VEL: 384 CM/SEC
BH CV ECHO MEASUREMENTS AVERAGE E/E' RATIO: 17.27
BH CV XLRA - TDI S': 10.1 CM/SEC
LEFT ATRIUM VOLUME INDEX: 20.9 ML/M2
LV EF 2D ECHO EST: 70 %
MAXIMAL PREDICTED HEART RATE: 147 BPM
STRESS TARGET HR: 125 BPM

## 2023-04-06 ENCOUNTER — OFFICE VISIT (OUTPATIENT)
Dept: CARDIOLOGY | Facility: CLINIC | Age: 73
End: 2023-04-06
Payer: MEDICARE

## 2023-04-06 VITALS
OXYGEN SATURATION: 94 % | WEIGHT: 269 LBS | HEART RATE: 67 BPM | DIASTOLIC BLOOD PRESSURE: 70 MMHG | HEIGHT: 62 IN | SYSTOLIC BLOOD PRESSURE: 120 MMHG | BODY MASS INDEX: 49.5 KG/M2

## 2023-04-06 DIAGNOSIS — I35.0 AORTIC STENOSIS, MODERATE: Primary | ICD-10-CM

## 2023-04-06 DIAGNOSIS — E11.22 TYPE 2 DIABETES MELLITUS WITH STAGE 3B CHRONIC KIDNEY DISEASE, WITHOUT LONG-TERM CURRENT USE OF INSULIN: ICD-10-CM

## 2023-04-06 DIAGNOSIS — I27.20 PULMONARY HYPERTENSION: ICD-10-CM

## 2023-04-06 DIAGNOSIS — G47.33 OBSTRUCTIVE SLEEP APNEA: ICD-10-CM

## 2023-04-06 DIAGNOSIS — E66.01 MORBID OBESITY WITH BMI OF 45.0-49.9, ADULT: ICD-10-CM

## 2023-04-06 DIAGNOSIS — N18.32 TYPE 2 DIABETES MELLITUS WITH STAGE 3B CHRONIC KIDNEY DISEASE, WITHOUT LONG-TERM CURRENT USE OF INSULIN: ICD-10-CM

## 2023-04-06 DIAGNOSIS — N18.32 STAGE 3B CHRONIC KIDNEY DISEASE: ICD-10-CM

## 2023-04-06 DIAGNOSIS — D50.9 IRON DEFICIENCY ANEMIA, UNSPECIFIED IRON DEFICIENCY ANEMIA TYPE: ICD-10-CM

## 2023-04-06 PROBLEM — I65.22 STENOSIS OF LEFT CAROTID ARTERY: Status: ACTIVE | Noted: 2020-11-23

## 2023-04-06 PROCEDURE — 1159F MED LIST DOCD IN RCRD: CPT | Performed by: INTERNAL MEDICINE

## 2023-04-06 PROCEDURE — 93000 ELECTROCARDIOGRAM COMPLETE: CPT | Performed by: INTERNAL MEDICINE

## 2023-04-06 PROCEDURE — 1160F RVW MEDS BY RX/DR IN RCRD: CPT | Performed by: INTERNAL MEDICINE

## 2023-04-06 PROCEDURE — 99204 OFFICE O/P NEW MOD 45 MIN: CPT | Performed by: INTERNAL MEDICINE

## 2023-04-06 RX ORDER — FAMOTIDINE 20 MG/1
20 TABLET, FILM COATED ORAL 2 TIMES DAILY
COMMUNITY

## 2023-04-06 RX ORDER — FERROUS SULFATE 325(65) MG
1 TABLET ORAL EVERY 12 HOURS SCHEDULED
COMMUNITY
Start: 2023-03-08

## 2023-04-06 RX ORDER — GLIMEPIRIDE 4 MG/1
4 TABLET ORAL
COMMUNITY

## 2023-04-06 RX ORDER — KETOCONAZOLE 20 MG/G
CREAM TOPICAL
COMMUNITY
Start: 2023-03-06

## 2023-04-06 RX ORDER — LISINOPRIL 20 MG/1
20 TABLET ORAL DAILY
COMMUNITY

## 2023-04-06 RX ORDER — DAPAGLIFLOZIN 10 MG/1
10 TABLET, FILM COATED ORAL DAILY
Qty: 30 TABLET | Refills: 11 | Status: SHIPPED | OUTPATIENT
Start: 2023-04-06

## 2023-04-06 RX ORDER — DOCUSATE SODIUM 100 MG/1
100 CAPSULE, LIQUID FILLED ORAL 2 TIMES DAILY
COMMUNITY

## 2023-04-06 RX ORDER — TRIAMCINOLONE ACETONIDE 1 MG/G
CREAM TOPICAL
COMMUNITY
Start: 2023-03-06

## 2023-04-06 RX ORDER — CLOPIDOGREL BISULFATE 75 MG/1
75 TABLET ORAL DAILY
COMMUNITY

## 2023-10-09 ENCOUNTER — TELEPHONE (OUTPATIENT)
Dept: NEUROLOGY | Age: 73
End: 2023-10-09

## 2023-10-09 NOTE — TELEPHONE ENCOUNTER
Called and left patient a  to take her SD card from her sleep machine and take it to [facility] for them to do a current download report for her appointment.

## 2023-10-11 ENCOUNTER — OFFICE VISIT (OUTPATIENT)
Dept: NEUROLOGY | Age: 73
End: 2023-10-11
Payer: MEDICARE

## 2023-10-11 VITALS
DIASTOLIC BLOOD PRESSURE: 75 MMHG | HEIGHT: 62 IN | BODY MASS INDEX: 50.05 KG/M2 | HEART RATE: 75 BPM | WEIGHT: 272 LBS | OXYGEN SATURATION: 98 % | SYSTOLIC BLOOD PRESSURE: 129 MMHG

## 2023-10-11 DIAGNOSIS — Z99.89 CPAP (CONTINUOUS POSITIVE AIRWAY PRESSURE) DEPENDENCE: ICD-10-CM

## 2023-10-11 DIAGNOSIS — G25.81 RESTLESS LEG SYNDROME: ICD-10-CM

## 2023-10-11 DIAGNOSIS — G47.33 OBSTRUCTIVE SLEEP APNEA: Primary | ICD-10-CM

## 2023-10-11 PROCEDURE — 1123F ACP DISCUSS/DSCN MKR DOCD: CPT | Performed by: PHYSICIAN ASSISTANT

## 2023-10-11 PROCEDURE — G8399 PT W/DXA RESULTS DOCUMENT: HCPCS | Performed by: PHYSICIAN ASSISTANT

## 2023-10-11 PROCEDURE — G8427 DOCREV CUR MEDS BY ELIG CLIN: HCPCS | Performed by: PHYSICIAN ASSISTANT

## 2023-10-11 PROCEDURE — 1036F TOBACCO NON-USER: CPT | Performed by: PHYSICIAN ASSISTANT

## 2023-10-11 PROCEDURE — 3017F COLORECTAL CA SCREEN DOC REV: CPT | Performed by: PHYSICIAN ASSISTANT

## 2023-10-11 PROCEDURE — G8484 FLU IMMUNIZE NO ADMIN: HCPCS | Performed by: PHYSICIAN ASSISTANT

## 2023-10-11 PROCEDURE — G8417 CALC BMI ABV UP PARAM F/U: HCPCS | Performed by: PHYSICIAN ASSISTANT

## 2023-10-11 PROCEDURE — 99213 OFFICE O/P EST LOW 20 MIN: CPT | Performed by: PHYSICIAN ASSISTANT

## 2023-10-11 PROCEDURE — 1090F PRES/ABSN URINE INCON ASSESS: CPT | Performed by: PHYSICIAN ASSISTANT

## 2023-10-11 NOTE — PATIENT INSTRUCTIONS
reports, additional testing, and face-to-face  clinical evaluation subsequent to any treatment, changes in treatment, and continued treatment. Caution:  Please abstain from driving or engaging in other activities which may be hazardous in the presence of diminished alertness or daytime drowsiness. And avoid the use of sedatives or alcohol, which can worsen sleep apnea and daytime drowsiness. Mask suggestions:  -     Resmed Airfit N20 (Nasal) or F20 (Full face mask). They conform to your face, thus decreasing the potential for mask leakage. You might like the AirTouch F20(full face mask). It has a \"memory foam\" like cushion. The AirFit F30 is a smaller style full face mask designed to sit low on and cover less of your face for fewer facial marks. AirFit N30i has a top of the head tube with a nasal mask. AirFit P10 reported to be the most comfortable nasal pillow mask. Resmed Mirage FX reported to be the most comfortable nasal mask. Resmed Mirage Hartline reported to be the most comfortable hybrid mask. AirTouch N20-memory foam nasal mask. Respironics: You might also like to try a nasal mask called a Dreamwear nasal mask or the Dreamwear nasal pillow. Another suggestion is the [facility], it is a minimal contact full face mask. The Tessy Jamshid incredible under the nose design makes it the only full face mask that won't cause red marks on the bridge of your nose when compared to other full face masks. The Dreamwear full face mask has a  soft feel, unique in-frame air-flow, and innovative air tube connection at the top of the head for the ultimate in sleep comfort. Comfort Gel Blue. Dreamwear gel pillows. [employer]: Brevida nasal pillow mask and Simplus FFM    The use of a memory foam CPAP pillow supports the head and neck throughout the night.

## 2023-10-11 NOTE — PROGRESS NOTES
REVIEW OF SYSTEMS    Constitutional: []Fever []Sweats []Chills [] Recent Injury   [x] Denies all unless marked  HENT:[]Headache  [] Head Injury  [] Sore Throat  [] Ear Pain  [] Dizziness [] Hearing Loss   [x] Denies all unless marked  Musculoskeletal: [] Arthralgia  [] Myalgias [] Muscle cramps  [] Muscle twitches   [x] Denies all unless marked   Spine:  [] Neck pain  [] Back pain  [] Sciatica  [x] Denies all unless marked  Neurological:[] Visual Disturbance [] Double Vision [] Slurred Speech [] Trouble swallowing  [] Vertigo [] Tingling [] Numbness [] Weakness [] Loss of Balance   [] Loss of Consciousness [] Memory Loss [] Seizures  [x] Denies all unless marked  Psychiatric/Behavioral:[] Depression [] Anxiety  [x] Denies all unless marked  Sleep: []  Insomnia [] Sleep Disturbance [] Snoring [] Restless Legs [] Daytime Sleepiness [x] [employer]  [] Denies all unless marked
Kristel Farmer reports that she is doing well with no new signs or symptoms of obstructive sleep apnea. The ESS score is 6/24. She is able to sleep well with PAP. She notes compliance with therapy and the compliance report indicates compliance. She averages 7 hours of sleep and  PAP usage per night. She should continue PAP use q hs. Regular follow ups with PCP and specialist to manage other comorbidities. Encourage weight loss through lifestyle modifications, including diet and exercise. Plan:     No orders of the defined types were placed in this encounter. 1.   Reviewed the etiology, pathophysiology, signs, symptoms, diagnosis, treatment options, and risks of untreated LENA. Risks may include, but are not limited to  hypertension, coronary artery disease, atrial fibrillation, CHF, diabetes, stroke, weight gain, impaired cognition, daytime somnolence, and motor vehicle accidents. Advised to abstain from driving or operating heavy machinery when drowsy and the use of respiratory suppressants. Reviewed current treatment plan. Counseled on multimodal approach to treatment of sleep apnea to include but not limited to diet, exercise, sleep hygiene, and compliance with pap therapy, if indicated. 2.  Will continue to evaluate for PAP clinical benefit and compliance during a 30 day period within the preceding 90 days PRN. 3.  The following educational material has been included in this visit after visit summary for your review: LENA/PAP guidelines-Discussed with the patient and all questions fully answered. 4.  Continue PAP therapy. The patient voices understanding and recognizes the need for adherence to the prescribed therapy  5. Order-supplies-[facility]   6. Follow up in 1 year to reassess symptomatology, PAP tolerance, efficacy and compliance     The patient indicates understanding of the above issues and agrees with the care plan.      I spent 25 minutes caring for [employer] on this date of

## 2023-11-22 ENCOUNTER — HOSPITAL ENCOUNTER (OUTPATIENT)
Dept: VASCULAR LAB | Age: 73
Discharge: HOME | End: 2023-11-22
Payer: MEDICARE

## 2023-11-22 DIAGNOSIS — I65.23 BILATERAL CAROTID ARTERY STENOSIS: ICD-10-CM

## 2023-11-22 PROCEDURE — 93880 EXTRACRANIAL BILAT STUDY: CPT

## 2023-11-22 PROCEDURE — 93923 UPR/LXTR ART STDY 3+ LVLS: CPT

## 2023-11-26 DIAGNOSIS — I65.23 BILATERAL CAROTID ARTERY STENOSIS: ICD-10-CM

## 2023-11-27 RX ORDER — CLOPIDOGREL BISULFATE 75 MG/1
TABLET ORAL
Qty: 90 TABLET | Refills: 3 | Status: SHIPPED | OUTPATIENT
Start: 2023-11-27

## 2023-12-04 ENCOUNTER — SCHEDULED TELEPHONE ENCOUNTER (OUTPATIENT)
Dept: VASCULAR SURGERY | Age: 73
End: 2023-12-04
Payer: MEDICARE

## 2023-12-04 DIAGNOSIS — I65.23 BILATERAL CAROTID ARTERY STENOSIS: Primary | ICD-10-CM

## 2023-12-04 PROCEDURE — 99442 PR PHYS/QHP TELEPHONE EVALUATION 11-20 MIN: CPT | Performed by: NURSE PRACTITIONER

## 2023-12-04 NOTE — PROGRESS NOTES
disease      Past Surgical History:   Procedure Laterality Date    BREAST BIOPSY Left     CARDIAC CATHETERIZATION      CAROTID ENDARTERECTOMY Left 11/23/2020    LEFT CAROTID ENDARTERECTOMY performed by Christopher Redd DO at [facility] Right     COLONOSCOPY  2004 and 2008    FOOT SURGERY Right     [facility]     HYSTERECTOMY (CERVIX STATUS UNKNOWN)      KNEE SURGERY Left     replacement    RECTAL SURGERY      abcess gland    STOMACH SURGERY      tumor removed     Family History   Problem Relation Age of Onset    Other Sister         blood clots    Heart Attack Mother     Heart Disease Mother     Diabetes Mother     High Blood Pressure Mother     Heart Attack Father     Heart Disease Father     Diabetes Father     High Blood Pressure Father     Heart Disease Brother     Diabetes Brother     Heart Attack Brother 39    Breast Cancer Sister      Social History     Tobacco Use    Smoking status: Never    Smokeless tobacco: Never   Substance Use Topics    Alcohol use: No              No data to display                Review of Systems      Eyes - no sudden vision change or amaurosis. Respiratory - no significant shortness of breath, wheezing, or stridor. No cough,  Cardiovascular - no chest pain, syncope, or significant dizziness. No significant leg swelling.  has not had claudication. Skin -  has not had new wound. Neurologic -  No speech difficulty or lateralizing weakness. Psychiatric - no severe anxiety or nervousness. No confusion. All other review of systems are negative.     PHYSICAL EXAMINATION:    [ INSTRUCTIONS:  \"[x]\" Indicates a positive item  \"[]\" Indicates a negative item  -- DELETE ALL ITEMS NOT EXAMINED]    [x] Alert  [x] Oriented to person/place/time    [x] No apparent distress  [] Toxic appearing  [x] Normal Mood  [] Anxious appearing    [] Depressed appearing  [] Confused appearing      [] Poor short term memory  [] Poor long term memory   Memory appears to be intact       Doppler

## 2024-04-14 PROBLEM — I50.32 CHRONIC HEART FAILURE WITH PRESERVED EJECTION FRACTION (HFPEF): Status: ACTIVE | Noted: 2024-04-14

## 2024-10-01 ENCOUNTER — TELEPHONE (OUTPATIENT)
Dept: NEUROLOGY | Age: 74
End: 2024-10-01

## 2024-10-01 NOTE — TELEPHONE ENCOUNTER
Tried to reach patient to reschedule their appointment with Flor Reyes, I had to leave a voicemail with the new appointment time and date. Requested patient to call the office if the new appointment will not work

## 2024-11-15 DIAGNOSIS — I65.23 BILATERAL CAROTID ARTERY STENOSIS: ICD-10-CM

## 2024-11-18 RX ORDER — CLOPIDOGREL BISULFATE 75 MG/1
TABLET ORAL
Qty: 90 TABLET | Refills: 3 | Status: SHIPPED | OUTPATIENT
Start: 2024-11-18

## 2024-11-27 ENCOUNTER — OFFICE VISIT (OUTPATIENT)
Dept: NEUROLOGY | Age: 74
End: 2024-11-27
Payer: MEDICARE

## 2024-11-27 VITALS
SYSTOLIC BLOOD PRESSURE: 130 MMHG | WEIGHT: 280 LBS | BODY MASS INDEX: 51.53 KG/M2 | HEART RATE: 73 BPM | OXYGEN SATURATION: 97 % | DIASTOLIC BLOOD PRESSURE: 53 MMHG | HEIGHT: 62 IN

## 2024-11-27 DIAGNOSIS — Z99.89 CPAP (CONTINUOUS POSITIVE AIRWAY PRESSURE) DEPENDENCE: ICD-10-CM

## 2024-11-27 DIAGNOSIS — G25.81 RESTLESS LEG SYNDROME: ICD-10-CM

## 2024-11-27 DIAGNOSIS — G47.33 OBSTRUCTIVE SLEEP APNEA: Primary | ICD-10-CM

## 2024-11-27 PROCEDURE — G8399 PT W/DXA RESULTS DOCUMENT: HCPCS | Performed by: PHYSICIAN ASSISTANT

## 2024-11-27 PROCEDURE — 1123F ACP DISCUSS/DSCN MKR DOCD: CPT | Performed by: PHYSICIAN ASSISTANT

## 2024-11-27 PROCEDURE — G8427 DOCREV CUR MEDS BY ELIG CLIN: HCPCS | Performed by: PHYSICIAN ASSISTANT

## 2024-11-27 PROCEDURE — 1036F TOBACCO NON-USER: CPT | Performed by: PHYSICIAN ASSISTANT

## 2024-11-27 PROCEDURE — G8417 CALC BMI ABV UP PARAM F/U: HCPCS | Performed by: PHYSICIAN ASSISTANT

## 2024-11-27 PROCEDURE — 1090F PRES/ABSN URINE INCON ASSESS: CPT | Performed by: PHYSICIAN ASSISTANT

## 2024-11-27 PROCEDURE — G8484 FLU IMMUNIZE NO ADMIN: HCPCS | Performed by: PHYSICIAN ASSISTANT

## 2024-11-27 PROCEDURE — 1159F MED LIST DOCD IN RCRD: CPT | Performed by: PHYSICIAN ASSISTANT

## 2024-11-27 PROCEDURE — 3017F COLORECTAL CA SCREEN DOC REV: CPT | Performed by: PHYSICIAN ASSISTANT

## 2024-11-27 PROCEDURE — 99213 OFFICE O/P EST LOW 20 MIN: CPT | Performed by: PHYSICIAN ASSISTANT

## 2024-11-27 RX ORDER — FERROUS SULFATE 325(65) MG
1 TABLET ORAL 2 TIMES DAILY
COMMUNITY
Start: 2024-09-02

## 2024-11-27 NOTE — PROGRESS NOTES
[facility]  [facility], [address]  [place] [address]  Phone [phone]  Fax [phone]       [facility] Annual Sleep Clinic Follow Up Encounter      Information:   Patient Name: Eden Wilson  :   1950  Age:   74 y.o.  MRN:   [id number]  Account #:  [id number]  Today:                24    Provider:  Flor Reyes PA-C    Chief Complaint   Patient presents with    Follow-up     DME compliance report in media & printed. Patient states no complaints.         Subjective:   Eden Wilson is a 74 y.o. female who  has a past medical history of Acid reflux, Arthritis, CPAP (continuous positive airway pressure) dependence, Diabetes mellitus (HCC), Gout, Heart murmur, Hyperlipidemia, Hypertension, Obstructive sleep apnea, Psoriasis, Restless leg syndrome, and Thyroid disease.    Eden Wilson comes in for an annual sleep clinic follow up. She is followed for severe LENA treated with PAP. She also has a hx of RLS, not requiring pharmacotherapy. The PSG, 2010 revealed an AHI of 20.5; in REM AHI of 56.5. She is prescribed auto CPAP with a pressure range of 8cm to 16cm. It is a [employer]ara Respironics device, replaced in .    Today she reports that she is doing well with the use of PAP. She uses a nasal mask. She reports her sleep is not always good quality due to vivid dreams, but overall good. She denies snoring over therapy. She denies excessive daytime somnolence. The ESS score is 9. The compliance report indicates that she is averaging 7 hours of PAP use per day. She reports that consistent PAP use has alleviated the previous LENA symptoms. The RLS is stable.      She continues to be followed by [facility] for carotid stenosis, s/p left carotid endarterectomy.          Objective:     Past Medical History:   Diagnosis Date    Acid reflux     Arthritis     CPAP (continuous positive airway pressure) dependence     8cm to 16cm    Diabetes mellitus (HCC)

## 2024-11-27 NOTE — PROGRESS NOTES

## 2024-11-27 NOTE — PATIENT INSTRUCTIONS
Patient education: Sleep apnea in adults       INTRODUCTION -- Normally during sleep, air moves through the throat and in and out of the lungs at a regular rhythm. In a person with sleep apnea, air movement is periodically diminished or stopped. There are two types of sleep apnea: obstructive sleep apnea and central sleep apnea. In obstructive sleep apnea, breathing is abnormal because of narrowing or closure of the throat. In central sleep apnea, breathing is abnormal because of a change in the breathing control and rhythm.  Sleep apnea is a serious condition that can affect a person's ability to safely perform normal daily activities and can affect long term health. Approximately 25 percent of adults are at risk for sleep apnea of some degree.  Men are more commonly affected than women. Other risk factors include middle and older age, being overweight or obese, and having a small mouth and throat.  This topic review focuses on the most common type of sleep apnea in adults, obstructive sleep apnea (LENA).    HOW SLEEP APNEA OCCURS -- The throat is surrounded by muscles that control the airway for speaking, swallowing, and breathing. During sleep, these muscles are less active, and this causes the throat to narrow.  In most people, this narrowing does not affect breathing. In others, it can cause snoring, sometimes with reduced or completely blocked airflow.  A completely blocked airway without airflow is called an obstructive apnea. Partial obstruction with diminished airflow is called a hypopnea. A person may have apnea and hypopnea during sleep.  Insufficient breathing due to apnea or hypopnea causes oxygen levels to fall and carbon dioxide to rise. Because the airway is blocked, breathing faster or harder does not help to improve oxygen levels until the airway is reopened. Typically, the obstruction requires the person to awaken to activate the upper airway muscles. Once the airway is opened, the person then  Please let her know I would recommend doing what is called the \"Sunday start method.\" She should start the pill on the first Sunday after she gets her period. So if she gets her period on a Friday, she should start the pill two days later on Sunday, etc. Let me know if more clarification is needed!

## 2024-12-04 ENCOUNTER — HOSPITAL ENCOUNTER (OUTPATIENT)
Dept: VASCULAR LAB | Age: 74
Discharge: HOME | End: 2024-12-06
Payer: MEDICARE

## 2024-12-04 DIAGNOSIS — I65.23 BILATERAL CAROTID ARTERY STENOSIS: ICD-10-CM

## 2024-12-04 PROCEDURE — 93880 EXTRACRANIAL BILAT STUDY: CPT

## 2024-12-07 LAB
VAS LEFT ARM BP DIA: 88 MMHG
VAS LEFT ARM BP: 142 MMHG
VAS LEFT CCA MID EDV: 13.9 CM/S
VAS LEFT CCA MID PSV: 114 CM/S
VAS LEFT CCA PROX EDV: 13.9 CM/S
VAS LEFT CCA PROX PSV: 133 CM/S
VAS LEFT ECA PSV: 97 CM/S
VAS LEFT ICA DIST EDV: 25.1 CM/S
VAS LEFT ICA DIST PSV: 103 CM/S
VAS LEFT ICA MID EDV: 21.1 CM/S
VAS LEFT ICA MID PSV: 104 CM/S
VAS LEFT ICA PROX EDV: 10.6 CM/S
VAS LEFT ICA PROX PSV: 57.8 CM/S
VAS LEFT VERTEBRAL EDV: 8.7 CM/S
VAS LEFT VERTEBRAL PSV: 45.4 CM/S
VAS RIGHT ARM BP DIA: 82 MMHG
VAS RIGHT ARM BP: 136 MMHG
VAS RIGHT CCA MID EDV: 10.2 CM/S
VAS RIGHT CCA MID PSV: 101 CM/S
VAS RIGHT CCA PROX EDV: 14.9 CM/S
VAS RIGHT CCA PROX PSV: 103 CM/S
VAS RIGHT ECA PSV: 72.2 CM/S
VAS RIGHT ICA DIST EDV: 17.5 CM/S
VAS RIGHT ICA DIST PSV: 105 CM/S
VAS RIGHT ICA MID EDV: 16.8 CM/S
VAS RIGHT ICA MID PSV: 86.2 CM/S
VAS RIGHT ICA PROX EDV: 16.1 CM/S
VAS RIGHT ICA PROX PSV: 82.7 CM/S
VAS RIGHT VERTEBRAL EDV: 10.4 CM/S
VAS RIGHT VERTEBRAL PSV: 47.7 CM/S

## 2024-12-18 ENCOUNTER — OFFICE VISIT (OUTPATIENT)
Dept: VASCULAR SURGERY | Age: 74
End: 2024-12-18
Payer: MEDICARE

## 2024-12-18 VITALS
WEIGHT: 280 LBS | DIASTOLIC BLOOD PRESSURE: 60 MMHG | TEMPERATURE: 98.2 F | HEIGHT: 62 IN | OXYGEN SATURATION: 95 % | SYSTOLIC BLOOD PRESSURE: 128 MMHG | BODY MASS INDEX: 51.53 KG/M2 | HEART RATE: 57 BPM

## 2024-12-18 DIAGNOSIS — I65.23 BILATERAL CAROTID ARTERY STENOSIS: Primary | ICD-10-CM

## 2024-12-18 PROCEDURE — 99214 OFFICE O/P EST MOD 30 MIN: CPT | Performed by: NURSE PRACTITIONER

## 2024-12-18 PROCEDURE — 1123F ACP DISCUSS/DSCN MKR DOCD: CPT | Performed by: NURSE PRACTITIONER

## 2024-12-18 PROCEDURE — 3017F COLORECTAL CA SCREEN DOC REV: CPT | Performed by: NURSE PRACTITIONER

## 2024-12-18 PROCEDURE — 1159F MED LIST DOCD IN RCRD: CPT | Performed by: NURSE PRACTITIONER

## 2024-12-18 PROCEDURE — G8417 CALC BMI ABV UP PARAM F/U: HCPCS | Performed by: NURSE PRACTITIONER

## 2024-12-18 PROCEDURE — 1036F TOBACCO NON-USER: CPT | Performed by: NURSE PRACTITIONER

## 2024-12-18 PROCEDURE — G8427 DOCREV CUR MEDS BY ELIG CLIN: HCPCS | Performed by: NURSE PRACTITIONER

## 2024-12-18 PROCEDURE — G8399 PT W/DXA RESULTS DOCUMENT: HCPCS | Performed by: NURSE PRACTITIONER

## 2024-12-18 PROCEDURE — G8484 FLU IMMUNIZE NO ADMIN: HCPCS | Performed by: NURSE PRACTITIONER

## 2024-12-18 PROCEDURE — 1090F PRES/ABSN URINE INCON ASSESS: CPT | Performed by: NURSE PRACTITIONER

## 2024-12-19 NOTE — PROGRESS NOTES
Eden Wilson (:  1950) is a 74 y.o. female,Established patient, here for evaluation of the following chief complaint(s):  Follow-up (1 year follow up bilateral carotid artery stenosis)            SUBJECTIVE/OBJECTIVE:  She presents for follow up of carotid artery stenosis.  She has a known history of carotid artery stenosis for 1 - 5 years. Her current treatment includes asa and plavix.She denies a history of CVA.  She reports has not had TIA's, episodes of lateralizing weakness and episodes of amaurosis fugax.    I have personally reviewed the following: problem list, current meds, allergies, PMH, PSH, family hx, and social hx  Eden Wilson is a 74 y.o. female with the following history as recorded in [facility]:  Patient Active Problem List    Diagnosis Date Noted    Left carotid bruit 10/08/2021    Carotid artery stenosis, symptomatic, left 2020    Carotid stenosis, left     Obstructive sleep apnea     Restless leg syndrome     CPAP (continuous positive airway pressure) dependence      Current Outpatient Medications   Medication Sig Dispense Refill    ferrous sulfate (IRON 325) 325 (65 Fe) MG tablet Take 1 tablet by mouth 2 times daily      clopidogrel (PLAVIX) 75 MG tablet TAKE 1 TABLET BY MOUTH EVERY DAY 90 tablet 3    glimepiride (AMARYL) 4 MG tablet Take 1 tablet by mouth every morning (before breakfast) Patient started this med 3 mo ago.      verapamil (CALAN SR) 240 MG extended release tablet TAKE 1 TABLET BY MOUTH EVERY DAY      lisinopril (PRINIVIL;ZESTRIL) 20 MG tablet Take 1 tablet by mouth daily      famotidine (PEPCID) 20 MG tablet Take 1 tablet by mouth daily      senna-docusate (PERICOLACE) 8.6-50 MG per tablet Take 2 tablets by mouth daily      ketoconazole (NIZORAL) 2 % cream Apply topically 2 times daily   3    triamcinolone (KENALOG) 0.1 % cream Apply topically 2 times daily   3    hydrALAZINE (APRESOLINE) 25 MG tablet Take 1 tablet by mouth 2 times daily

## 2024-12-30 NOTE — H&P (VIEW-ONLY)
Imit[facility]    Last office visit date: 3/5/2024    Next appointment scheduled?:Visit date not found    Number of refills given: 3     Patient Care Team:  Yuko Todd MD as PCP - General (Internal Medicine)  Yuko Todd MD as PCP [facility]  TEDDY Harley as Physician Assistant (Physician Assistant Medical)      History and Physical:  Mrs. Tonya Kellogg is a 70 yo female who has a history that includes LENA and uses a CPAP. She was referred to us by TEDDY Harley for evaluation of a critical left ICA stenosis found on NICS after a left bruit was noted. This is a new diagnosis for the patient. She is not on antiplatelet or statin therapy. She had been on ASA but because it caused severe GI upset she was told to stop this by her PCP. She denies a history of CVA. She reports no episodes of speech difficutlies or episodes of lateralizing weakness/numbness/tingling. She denies any severe dizziness, syncopal episodes or tunnel vision. She reports that over the last 2 months she has had some visual disturbance in the left eye. She reports that she has episodes where her left eye vision decreases and then she sees \"black spots\" in her visual field. She reports that she saw her eye Doctor and was told that he saw something in her eye and she needed to see Vascular for this and needed a carotid US.        Emilio Parada is a 69 y.o. female with the following history reviewed and recorded in [facility]:  Patient Active Problem List    Diagnosis Date Noted    Obstructive sleep apnea     Restless leg syndrome     CPAP (continuous positive airway pressure) dependence      8cm       Current Outpatient Medications   Medication Sig Dispense Refill    verapamil (VERELAN) 240 MG extended release capsule Take 240 mg by mouth nightly      lisinopril (PRINIVIL;ZESTRIL) 20 MG tablet Take 20 mg by mouth daily      famotidine (PEPCID) 20 MG tablet Take 20 mg by mouth daily      senna-docusate (PERICOLACE) 8.6-50 MG per tablet Take 2 tablets by mouth daily      fluocinonide (LIDEX) 0.05 % cream Apply topically 2 times daily Apply topically 2 times daily.  clopidogrel (PLAVIX) 75 MG tablet Take 1 tablet by mouth daily 30 tablet 0    mupirocin (BACTROBAN) 2 % ointment Apply to each swift twice daily starting on 11/18/2020. 3 g 0    ketoconazole (NIZORAL) 2 % cream Apply topically 2 times daily   3    triamcinolone (KENALOG) 0.1 % cream Apply topically 2 times daily   3    hydrALAZINE (APRESOLINE) 25 MG tablet Take 25 mg by mouth 2 times daily       allopurinol (ZYLOPRIM) 100 MG tablet Take 300 mg by mouth daily       docusate sodium (COLACE) 100 MG capsule Take 100 mg by mouth 4 times daily      furosemide (LASIX) 40 MG tablet Take 40 mg by mouth 2 times daily   3    cyanocobalamin 1000 MCG/ML injection Inject 1,000 mcg into the skin every 14 days   2    DULoxetine (CYMBALTA) 60 MG capsule Take 60 mg by mouth daily   3    ZETIA 10 MG tablet Take 10 mg by mouth daily   3    FREESTYLE LITE strip 1 ITEM DAILY TEST BLOOD SUGAR ONCE DAILY. DX:E11.9  3    levothyroxine (SYNTHROID) 125 MCG tablet Take 125 mcg by mouth Daily   2    metFORMIN (GLUCOPHAGE) 1000 MG tablet Take 1,000 mg by mouth 2 times daily (with meals) 1000mg BID  11    potassium chloride (MICRO-K) 10 MEQ CR capsule Take 10 mEq by mouth daily   3    propranolol (INDERAL) 10 MG tablet Take 20 mg by mouth 2 times daily   0    vitamin E 400 UNIT capsule Take 400 Units by mouth daily      CPAP Machine MISC by Does not apply route       No current facility-administered medications for this visit. Allergies: Latex; Ibuprofen; Asa [aspirin]; Bee venom; Betadine [povidone iodine]; Codeine; Donnatal [phenobarbital-belladonna alk]; Fulvicin-p-g [griseofulvin]; Tape [adhesive tape]; Lortab [hydrocodone-acetaminophen];  Pravastatin; and Wasp venom  Past Medical History:   Diagnosis Date    Acid reflux     Arthritis     CPAP (continuous positive airway pressure) dependence     8cm to 16cm    Diabetes mellitus (HCC)     Heart murmur     Hyperlipidemia     Hypertension  Obstructive sleep apnea     AHI:  20.5; In REM AHI of 56.5 per PSG, 7/2010    Psoriasis     Restless leg syndrome     Thyroid disease      Past Surgical History:   Procedure Laterality Date    BREAST BIOPSY Left     CARDIAC CATHETERIZATION      CATARACT REMOVAL Right     COLONOSCOPY  2004 and 2008    FOOT SURGERY Right     mortons     HYSTERECTOMY      KNEE SURGERY Left     replacement    RECTAL SURGERY      abcess gland    STOMACH SURGERY      tumor removed     Family History   Problem Relation Age of Onset    Other Sister         blood clots    Heart Attack Mother     Heart Disease Mother     Diabetes Mother     High Blood Pressure Mother     Heart Attack Father     Heart Disease Father     Diabetes Father     High Blood Pressure Father     Heart Disease Brother     Diabetes Brother     Heart Attack Brother 39    Breast Cancer Sister      Social History     Tobacco Use    Smoking status: Never Smoker    Smokeless tobacco: Never Used   Substance Use Topics    Alcohol use: No       Old records have been obtained from the referring provider. These records have been reviewed and summarized. Review of Systems    Constitutional - no significant activity change, appetite change, or unexpected weight change. No fever or chills. No diaphoresis or significant fatigue. HENT - no significant rhinorrhea or epistaxis. No tinnitus or significant hearing loss. Eyes - no sudden vision change or amaurosis. Respiratory - no significant shortness of breath, wheezing, or stridor. No apnea, cough, or chest tightness associated with shortness of breath. Cardiovascular - no chest pain, syncope, or significant dizziness. No palpitations or significant leg swelling. No claudication. Gastrointestinal - no abdominal swelling or pain. No blood in stool. No severe constipation, diarrhea, nausea, or vomiting. Genitourinary - No difficulty urinating, dysuria, frequency, or urgency.   No flank pain or hematuria. Musculoskeletal - no back pain, gait disturbance, or myalgia. Skin - no color change, rash, pallor, or new wound. Neurologic - no dizziness, facial asymmetry, or light headedness. No seizures. No, AF,  speech difficulty or lateralizing weakness. Hematologic - no easy bruising or excessive bleeding. Psychiatric - no severe anxiety or nervousness. No confusion. All other review of systems are negative. Physical Exam    There were no vitals taken for this visit. Constitutional - well developed, well nourished. No diaphoresis or acute distress. HENT - head normocephalic. Right external ear canal appears normal.  Left external ear canal appears normal.  Septum appears midline. Eyes - conjunctiva normal.  EOMS normal.  No exudate. No icterus. Neck- ROM appears normal, no tracheal deviation. Cardiovascular - Regular rate and rhythm. Heart sounds are normal.  No murmur, rub, or gallop. Carotid pulses are 2+ to palpation bilaterally without bruit. Extremities - Radial and ulnar pulses are 2+ to palpation bilaterally. No cyanosis, clubbing, or significant edema. No signs atheroembolic event. Pulmonary - effort appears normal.  No respiratory distress. Lungs - Breath sounds normal. No wheezes or rales. GI - Abdomen - soft, non tender, bowel sounds X 4 quadrants. No guarding or rebound tenderness. No distension or palpable mass. Genitourinary - deferred. Musculoskeletal - ROM appears normal.  No significant edema. Neurologic - alert and oriented X 3. Physiologic. Tongue midline. Face symmetric. No lateralizing weakness noted. Skin - warm, dry, and intact. No rash, erythema, or pallor.   Psychiatric - mood, affect, and behavior appear normal.  Judgment and thought processes appear normal.    Risk factors for atherosclerosis of all vascular beds have been reviewed with the patient including:  Family history, tobacco abuse in all forms, elevated cholesterol, hyperlipidemia, and diabetes. Carotid Doppler results:      Impression          There is mixed plaque visualized in the bilateral internal carotid     arteries.    Amedeo Nails is less than 50% stenosis in the right internal carotid artery.     There is 70-99% stenosis of the left internal carotid artery.     There is normal antegrade flow in the bilateral vertebral arteries.          Signature          ----------------------------------------------------------------     Electronically signed by Janine Steinberg MD(Interpreting     physician) on 10/30/2020 12:54 PM     ----------------------------------------------------------------         Blood Pressure:Right arm 184/98 mmHg. Left arm 180/96 mmHg.         Velocities are measured in cm/s ; Diameters are measured in mm         Carotid Right Measurements    +------------+-------+-------+--------+-------+------------+---------------+    ! Location    !PSV    !EDV    ! Angle   !RI     !%Stenosis   ! Tortuosity     !    +------------+-------+-------+--------+-------+------------+---------------+    ! Prox CCA    !99.8   !16.5   !60      !0.83   !            !               !    +------------+-------+-------+--------+-------+------------+---------------+    ! Mid CCA     !127    !18.9   !60      !0.85   !            !               !    +------------+-------+-------+--------+-------+------------+---------------+    ! Prox ICA    !90.4   !21.2   !60      !0.77   !            !               !    +------------+-------+-------+--------+-------+------------+---------------+    ! Mid ICA     !95.9   !22     !60      !0.77   !            !               !    +------------+-------+-------+--------+-------+------------+---------------+    ! Dist ICA    !122    !30.6   !60      !0.75   !            !               !    +------------+-------+-------+--------+-------+------------+---------------+    ! Prox ECA    !90.4   !12.6   !60      !0.86   !            !               ! +------------+-------+-------+--------+-------+------------+---------------+    ! Vertebral   !50.3   !14.9   !60      !0.7    !            !               !    +------------+-------+-------+--------+-------+------------+---------------+           - There is antegrade vertebral flow noted on the right side.           - Additional Measurements:ICAPSV/CCAPSV 1.22. ICAEDV/CCAEDV 1.85.         Carotid Left Measurements    +------------+-------+-------+--------+-------+------------+---------------+    ! Location    !PSV    !EDV    ! Angle   !RI     !%Stenosis   ! Tortuosity     !    +------------+-------+-------+--------+-------+------------+---------------+    ! Prox CCA    !141    !21.2   !60      !0.85   !            !               !    +------------+-------+-------+--------+-------+------------+---------------+    ! Mid CCA     !80.1   !16.5   !60      !0.79   !            !               !    +------------+-------+-------+--------+-------+------------+---------------+    ! Prox ICA    !003    !171    !60      !0.67   !            !               !    +------------+-------+-------+--------+-------+------------+---------------+    ! Mid ICA     !113    !24.6   !60      !0.78   !            !               !    +------------+-------+-------+--------+-------+------------+---------------+    ! Dist ICA    !94.3   !29.5   !60      !0.69   !            !               !    +------------+-------+-------+--------+-------+------------+---------------+    ! Prox ECA    !105    !11     !60      !0.9    !            !               !    +------------+-------+-------+--------+-------+------------+---------------+    ! Vertebral   !75.6   !14.7   !60      !0.81   !            !               !    +------------+-------+-------+--------+-------+------------+---------------+           - There is antegrade vertebral flow noted on the left side.           - Additional Measurements:ICAPSV/CCAPSV 3.65. ICAEDV/CCAEDV 8.07.           Individual velocities reviewed: Yes. Results were reviewed with the patient. Assessment      No diagnosis found. Plan      Recommend initiation of statin therapy  We will obtain a CTA head/neck. After we have reviewed the images we will notify her of further recommendations. We discussed holding her Glucophage for the day of the procedure and 2 days afterwards. I encourage her to really watch her diet over the next few days. We also discussed the need to use contrast for the CT. She had a normal creatinine and GFR 6/2020. Addendum: 11/13/2020  CT called stating the patient's creatinine was 1.6, GFR 32. We will cancel CT and pre treat the patient with IVF's pre and post CT. We will call the patient with the results and further recommendations. CTA head/neck - (reviewed by Dr. Jose Gipson)  FINDINGS:    ANGIOGRAM:    Typical three-vessel branching pattern off the aortic arch. . There is    no flow-limiting stenosis at the main arch origins. Normal course and    caliber of the common carotid arteries. The minimum luminal diameter at the left internal carotid origin    measures less than 1 mm. Diameter is quite narrow at the origin,    imaging as a string sign. The downstream caliber is measured at 4.7    mm. The minimum luminal diameter of the right internal carotid artery    origin measures 5.2 mm, in comparison to a downstream caliber of 5.1    mm. Posteriorly, the vertebral arteries and visualized basilar artery are    unremarkable. . Right vertebral artery is dominant. Distal carotid arteries are patent. Bilateral anterior and middle    cerebral artery branching appear symmetric. Intracranial vertebral    arteries and basilar artery are normal in caliber. The posterior    cerebral artery branching is symmetric. No large vessel occlusion or    flow-limiting stenosis. OTHER FINDINGS: No acute intracranial process is identified.     Specifically, no midline shift, mass effect, or intracranial

## 2025-03-28 NOTE — PATIENT INSTRUCTIONS
Patient education: Sleep apnea in adults       INTRODUCTION  Normally during sleep, air moves through the throat and in and out of the lungs at a regular rhythm. In a person with sleep apnea, air movement is periodically diminished or stopped. There are two types of sleep apnea: obstructive sleep apnea and central sleep apnea. In obstructive sleep apnea, breathing is abnormal because of narrowing or closure of the throat. In central sleep apnea, breathing is abnormal because of a change in the breathing control and rhythm. Sleep apnea is a serious condition that can affect a person's ability to safely perform normal daily activities and can affect long term health. Approximately 25 percent of adults are at risk for sleep apnea of some degree. Men are more commonly affected than women. Other risk factors include middle and older age, being overweight or obese, and having a small mouth and throat. This topic review focuses on the most common type of sleep apnea in adults, obstructive sleep apnea (LENA). HOW SLEEP APNEA OCCURS  The throat is surrounded by muscles that control the airway for speaking, swallowing, and breathing. During sleep, these muscles are less active, and this causes the throat to narrow. In most people, this narrowing does not affect breathing. In others, it can cause snoring, sometimes with reduced or completely blocked airflow. A completely blocked airway without airflow is called an obstructive apnea. Partial obstruction with diminished airflow is called a hypopnea. A person may have apnea and hypopnea during sleep. Insufficient breathing due to apnea or hypopnea causes oxygen levels to fall and carbon dioxide to rise. Because the airway is blocked, breathing faster or harder does not help to improve oxygen levels until the airway is reopened. Typically, the obstruction requires the person to awaken to activate the upper airway muscles.  Once the airway is opened, the person then takes Swenson's office next?   several deep breaths to catch up on breathing. As the person awakens, he or she may move briefly, snort or snore, and take a deep breath. Less frequently, a person may awaken completely with a sensation of gasping, smothering, or choking. If the person falls back to sleep quickly, he or she will not remember the event. Many people with sleep apnea are unaware of their abnormal breathing in sleep, and all patients underestimate how often their sleep is interrupted. Awakening from sleep causes sleep to be unrefreshing and causes fatigue and daytime sleepiness. Anatomic causes of obstructive sleep apnea   Most patients have LENA because of a small upper airway. As the bones of the face and skull develop, some people develop a small lower face, a small mouth, and a tongue that seems too large for the mouth. These features are genetically determined, which explains why LENA tends to cluster in families. Obesity is another major factor. Tonsil enlargement can be an important cause, especially in children. SLEEP APNEA SYMPTOMS  The main symptoms of LENA are loud snoring, fatigue, and daytime sleepiness. However, some people have no symptoms. For example, if the person does not have a bed partner, he or she may not be aware of the snoring. Fatigue and sleepiness have many causes and are often attributed to overwork and increasing age. As a result, a person may be slow to recognize that they have a problem. A bed partner or spouse often prompts the patient to seek medical care. Other symptoms may include one or more of the following:  ?Restless sleep  ? Awakening with choking, gasping, or smothering  ? Morning headaches, dry mouth, or sore throat  ? Waking frequently to urinate  ? Awakening unrested, groggy  ? Low energy, difficulty concentrating, memory impairment    Risk factors  Certain factors increase the risk of sleep apnea.   ?Increasing age [de-identified] LENA occurs at all ages, but it is more common in middle and older age adults. ?Male sex  LENA is two times more common in men, especially in middle age. ?Obesity  The more obese a person is, the more likely he or she is to have LENA. ? Sedation from medication or alcohol  This interferes with the ability to awaken from sleep and can lengthen periods of apnea (no breathing), with potentially dangerous consequences. ? Abnormality of the airway. SLEEP APNEA CONSEQUENCES  Complications of sleep apnea can include daytime sleepiness and difficulty concentrating. The consequence of this is an increased risk of accidents and errors in daily activities. Studies have shown that people with severe LENA are more than twice as likely to be involved in a motor vehicle accident as people without these conditions. People with LENA are encouraged to discuss options for driving, working, and performing other high-risk tasks with a healthcare provider. In addition, people with untreated LENA may have an increased risk of cardiovascular problems such as high blood pressure, heart attack, abnormal heart rhythms, or stroke. This risk may be due to changes in the heart rate and blood pressure that occur during sleep. SLEEP APNEA DIAGNOSIS  The diagnosis of LENA is best made by a knowledgeable sleep medicine specialist who has an understanding of the individual's health issues. The diagnosis is usually based upon the person's medical history, physical examination, and testing, including:  ? A complaint of snoring and ineffective sleep  ? Neck size (greater than 16 inches in men or 14 inches in women) is associated with an increased risk of sleep apnea  ? A small upper airway: difficulty seeing the throat because of a tongue that is large for the mouth  ? High blood pressure, especially if it is resistant to treatment  ? If a bed partner has observed the patient during episodes of stopped breathing (apnea), choking, or gasping during sleep, there is a strong possibility of sleep apnea.     Testing is usually performed in a sleep laboratory. A full sleep study is called a polysomnogram. The polysomnogram measures the breathing effort and airflow, blood oxygen level, heart rate and rhythm, duration of the various stages of sleep, body position, and movement of the arms/legs. Home monitoring devices are available that can perform a sleep study. This is a reasonable alternative to conventional testing in a sleep laboratory if the clinician strongly suspects moderate or severe sleep apnea and the patient does not have other illnesses or sleep disorders that may interfere with the results. SLEEP APNEA TREATMENT  Sleep apnea is best treated by a knowledgeable sleep medicine specialist. The goal of treatment is to maintain an open airway during sleep. Effective treatment will eliminate the symptoms of sleep disturbance; long-term health consequences are also reduced. Most treatments require nightly use. The challenge for the clinician and the patient is to select an effective therapy that is appropriate for the patient's problem and that is acceptable for long term use. Auto-titrating CPAP delivers an amount of PAP that varies during the night. The variation is dependent on event detection software algorithms, which will increase the pressure gradually in response to flow changes until adequate patency is detected. After a period of sustained upper airway patency, the delivered level of pressure gradually decreases until the algorithm identifies recurrent upper airway obstruction, at which point the delivered pressure again increases. The result is that the delivered pressure varies throughout the night, in an effort to provide the lowest pressure that is necessary to maintain upper airway patency. Continuous positive airway pressure (CPAP)  The most effective treatment for sleep apnea uses air pressure from a mechanical device to keep the upper airway open during sleep.  A CPAP (continuous positive airway pressure)  device uses an air-tight attachment to the nose, typically a mask, connected to a tube and a blower which generates the pressure. Devices that fit comfortably into the nasal opening, rather than over the nose, are also available. CPAP should be used any time the person sleeps (day or night). The CPAP device is usually used for the first time in the sleep lab, where a technician can adjust the pressure and select the best equipment to keep the airway open. Alternatively, an auto device with a self-adjusting pressure feature, provided with proper education and training, can get treatment started without another sleep test. While the treatment may seem uncomfortable, noisy, or bulky at first, most people accept the treatment after experiencing better sleep. However, difficulty with mask comfort and nasal congestion prevent up to 50 percent of people from using the treatment on a regular basis. Continued follow up with a healthcare provider helps to ensure that the treatment is effective and comfortable. Information from the CPAP machine is often used by physicians, therapists, and insurers to track the success of treatment. CPAP can be delivered with different features to improve comfort and solve problems that may come up during treatment. Changes in treatment may be needed if symptoms do not improve or if the persons condition changes, such as a gain or loss of weight. Adjust sleep position  Adjusting sleep position (to stay off the back) may help improve sleep quality in people who have LENA when sleeping on the back. However, this is difficult to maintain throughout the night and is rarely an adequate solution. Weight loss  Weight loss may be helpful for obese or overweight patients. Weight loss may be accomplished with dietary changes, exercise, and/or surgical treatment.  However, it can be difficult to maintain weight loss; the five-year success of non-surgical weight loss is only 5 percent, meaning that 95 percent of people regain lost weight. Avoid alcohol and other sedatives  Alcohol can worsen sleepiness, potentially increasing the risk of accidents or injury. People with LENA are often counseled to drink little to no alcohol, even during the daytime. Similarly, people who take anti-anxiety medications or sedatives to sleep should speak with their healthcare provider about the safety of these medications. People with LENA must notify all healthcare providers, including surgeons, about their condition and the potential risks of being sedated. People with LENA who are given anesthesia and/or pain medications require special management and close monitoring to reduce the risk of a blocked airway. Dental devices  A dental device, called an oral appliance or mandibular advancement device, can reposition the jaw (mandible), bringing the tongue and soft palate forward as well. This may relieve obstruction in some people. This treatment is excellent for reducing snoring, although the effect on LENA is sometimes more limited. As a result, dental devices are best used for mild cases of LENA when relief of snoring is the main goal. Failure to tolerate and accept CPAP is another indication for dental devices. While dental devices are not as effective as CPAP for LENA, some patients prefer a dental device to CPAP. Side effects of dental devices are generally minor but may include changes to the bite with prolonged use. Surgical treatment  Surgery is an alternative therapy for patients who cannot tolerate or do not improve with nonsurgical treatments such as CPAP or oral devices. Surgery can also be used in combination with other nonsurgical treatments. Surgical procedures reshape structures in the upper airways or surgically reposition bone or soft tissue. Uvulopalatopharyngoplasty (UPPP) removes the uvula and excessive tissue in the throat, including the tonsils, if present.  Other procedures, such as maxillomandibular advancement (MMA), address both the upper and lower pharyngeal airway more globally. UPPP alone has limited success rates (less than 50 percent) and people can relapse (when LEAN symptoms return after surgery). As a result, this surgery is only recommended in a minority of people and should be considered with caution. MMA may have a higher success rate, particularly in people with abnormal jaw (maxilla and mandible) anatomy, but it is the most complicated procedure. A newer surgical approach, nerve stimulation to protrude the tongue, has promising success rates in very selected people. Tracheostomy creates a permanent opening in the neck. It is reserved for people with severe disease in whom less drastic measures have failed or are inappropriate. Although it is always successful in eliminating obstructive sleep apnea, tracheostomy requires significant lifestyle changes and carries some serious risks (eg, infection, bleeding, blockage). All surgical treatments require discussions about the goals of treatment, the expected outcomes, and potential complications. Hypoglossal nerve stimulator- \"Inspire\" device    PAP treatment failure:  Possible causes of treatment failure include nonadherence or suboptimal adherence, weight gain, an inappropriate level of prescribed positive pressure, or an additional disorder causing sleepiness (eg, narcolepsy) that may require alterations in the therapeutic regimen. A review of medications should also be undertaken since many drugs may lead to sleepiness. Inadequate sleep time may also negate the expected effects from treatment of LENA. Also, pt's can have persistent hypersomnolence associated with sleep apnea even in the presence of adequate therapy and at those times Provigil or Nuvigil or other stimulants may be indicated.     Once the patient's positive airway pressure therapy has been optimized and symptoms resolved, a regimen of long-term follow-up should be established. Annual visits are reasonable, with more frequent visits in between if new issues arise. The purpose of long-term follow-up is to assess usage and monitor for recurrent LENA, new side effects, air leakage, and fluctuations in body weight. WHERE TO GET MORE INFORMATION  Your healthcare provider is the best source of information for questions and concerns related to your medical problem. Organizations  American [employer] Association  Provides information about sleep apnea to the public, publishes a newsletter, and serves as an advocate for people with the disorder. Renetta, [address] [facility], [address] [facility]   [email or web address] org   [email or web address] org   Tel: [phone]   Fax: [facility] organization that works to [employer] and safety by promoting public understanding of sleep and sleep disorders. Supports sleep-related education, research, and advocacy; produces and distributes educational materials to the public and healthcare professionals; and offers postdoctoral fellowships and grants for sleep researchers. [address]   Kiki@[employer]. org   [email or web address] org   Tel: [phone]   Fax: [phone]    Important information:  Medicare/private insurance CPAP/BiPAP/APAP requirements:  Medicare/private insurance has specific requirements for PAP compliance that must be met during the first 90 days of use to continue coverage for CPAP/BiPAP/APAP  from day 91 and beyond. The policy requires that patients use a PAP device 4 hours per 24 hour period, at least 70% of the time over a 30 day period. This data must be downloaded as a report direct from the PAP devices. This is called a compliance download. Your PAP supplier will assist you in this matter.      Note:  Where applicable, we will utilize PAP device efficiency reports, additional testing, and face-to-face  clinical evaluation subsequent to any treatment, changes in treatment, and continued treatment. Caution:  Please abstain from driving or engaging in other activities which may be hazardous in the presence of diminished alertness or daytime drowsiness. And avoid the use of sedatives or alcohol, which can worsen sleep apnea and daytime drowsiness. Mask suggestions:  -     Resmed Airfit N20 (Nasal) or F20 (Full face mask). They conform to your face, thus decreasing the potential for mask leakage. You might like the AirTouch F20(full face mask). It has a \"memory foam\" like cushion. The AirFit F30 is a smaller style full face mask designed to sit low on and cover less of your face for fewer facial marks. AirFit N30i has a top of the head tube with a nasal mask. AirFit P10 reported to be the most comfortable nasal pillow mask. Resmed Mirage FX reported to be the most comfortable nasal mask. Resmed Mirage Kings reported to be the most comfortable hybrid mask. AirTouch N20-memory foam nasal mask. Respironics: You might also like to try a nasal mask called a Dreamwear nasal mask or the Dreamwear nasal pillow. Another suggestion is the [facility], it is a minimal contact full face mask. The Mery Lindsayer incredible under the nose design makes it the only full face mask that won't cause red marks on the bridge of your nose when compared to other full face masks. The Dreamwear full face mask has a  soft feel, unique in-frame air-flow, and innovative air tube connection at the top of the head for the ultimate in sleep comfort. Comfort Gel Blue. Dreamwear gel pillows. [employer] Mel[employer]: Brevida nasal pillow mask and Simplus FFM    The use of a memory foam CPAP pillow supports the head and neck throughout the night. Patient education: Restless legs syndrome     Written by the doctors and editors at [facility]     What is restless legs syndrome?   Restless legs syndrome, or RLS for short, is a condition that causes strange sensations in your legs. RLS is also sometimes called \"Bedolla-Ekbom disease. \" If you have RLS, you probably have the urge to move your legs at night. This can make it hard to get comfortable and fall asleep. In some cases, RLS happens on its own and seems to be passed on in families. In other cases, the condition seems to be linked to other medical problems. For instance, a condition called \"anemia,\" in which there is too little iron in the blood, seems to increase the risk of RLS. Other conditions that increase the risk of RLS include kidney disease, diabetes, and multiple sclerosis. Pregnancy seems to increase a woman's risk of developing RLS, too. What are the symptoms of RLS?  People who have RLS get an uncomfortable urge to move their legs when they are at rest. They describe the feeling as crawling, creeping, pulling, or itching. And they say the feeling is deep in the legs  not on the skin  usually below the knees. These symptoms usually get worse as the day moves on, and they are worst at night. The symptoms can be especially bad when trying to stay still to read a book, watch television, or fall asleep. But people can make the feeling go away temporarily if they walk around or move their legs. Some people with RLS find that their legs move on their own while they are asleep. In short, the symptoms:  ? Happen when you are at rest  ?Go away if you move your legs on purpose  ? Are worst at night  ? Sometimes include the legs moving on their own during sleep  Together, the symptoms of RLS can make it hard to get a good night's sleep. People with the condition often feel tired during the day. Is there a test for RLS?  There is a test, but it is not usually necessary.  Your doctor or nurse should be able to tell if you have it by asking about your symptoms and doing an exam. Still, it is possible that your doctor or nurse will decide to send you for a \"sleep study,\" to be sure of what is happening. For a sleep study, you spend the night in a lab, and you are hooked up to different machines that monitor your movements, heart rate, breathing, and other body functions. Is there anything I can do on my own to feel better?  Yes. You might feel better if you:  ? Do activities that keep your mind alert during the day, such as crossword puzzles  ? Get moderate regular exercise  ? Massage your legs (or have someone massage them)  ? Apply heat to your legs with heating pads or by taking a warm bath  ? Avoid taking medicines that can make RLS worse  These include antihistamines like diphenhydramine (sample brand name: Benadryl). Should I see a doctor or nurse?  See your doctor or nurse if your condition bothers you, or if it keeps you from getting a good night's sleep. How is RLS treated?  Some people with RLS do not need medicine for it because they have mild symptoms that don't bother them very often. If treatment is needed, there are a number of medicines doctors can suggest. Examples include:  ?Iron supplements  ? Pramipexole (brand name: Mirapex)  ? Ropinirole (brand name: Eliza Ruddle)  ? Rotigotine (brand name: Neupro)  ? Carbidopa-levodopa (brand name: Sinemet)  ? Gabapentin enacarbil (brand name: [place])  ? Gabapentin (brand name: Neurontin)  ? Pregabalin (brand name: Lyrica)  In people with RLS who also have a severe form of kidney disease called kidney failure, the RLS might improve with a treatment called hemodialysis (also known as just dialysis). What if I am pregnant?  If you are pregnant, you can take iron supplements and try the other tips that do not involve taking prescription medicines. Most of the medicines used to treat RLS are not proven to be safe to take during pregnancy. If your symptoms are bad, there are some medicines that might be OK to take. But keep in mind that the condition usually goes away or gets much better after you give birth.

## 2025-04-02 ENCOUNTER — HOSPITAL ENCOUNTER (OUTPATIENT)
Dept: CARDIOLOGY | Facility: HOSPITAL | Age: 75
Discharge: HOME | End: 2025-04-02
Admitting: INTERNAL MEDICINE
Payer: MEDICARE

## 2025-04-02 VITALS
SYSTOLIC BLOOD PRESSURE: 162 MMHG | BODY MASS INDEX: 50.79 KG/M2 | HEIGHT: 62 IN | DIASTOLIC BLOOD PRESSURE: 57 MMHG | WEIGHT: 276 LBS

## 2025-04-02 DIAGNOSIS — I35.0 AORTIC STENOSIS, MODERATE: ICD-10-CM

## 2025-04-02 PROCEDURE — 93306 TTE W/DOPPLER COMPLETE: CPT

## 2025-04-04 LAB
AORTIC DIMENSIONLESS INDEX: 0.57 (DI)
AV MEAN PRESS GRAD SYS DOP V1V2: 15.2 MMHG
AV VMAX SYS DOP: 260.8 CM/SEC
BH CV ECHO MEAS - AO MAX PG: 27.2 MMHG
BH CV ECHO MEAS - AO ROOT DIAM: 2.15 CM
BH CV ECHO MEAS - AO V2 VTI: 66.9 CM
BH CV ECHO MEAS - AVA(I,D): 1.53 CM2
BH CV ECHO MEAS - EDV(CUBED): 129.3 ML
BH CV ECHO MEAS - EDV(MOD-SP4): 74.1 ML
BH CV ECHO MEAS - EF(MOD-SP4): 67.9 %
BH CV ECHO MEAS - ESV(CUBED): 35.7 ML
BH CV ECHO MEAS - ESV(MOD-SP4): 23.8 ML
BH CV ECHO MEAS - FS: 34.9 %
BH CV ECHO MEAS - IVS/LVPW: 1.05 CM
BH CV ECHO MEAS - IVSD: 1.06 CM
BH CV ECHO MEAS - LA DIMENSION: 3.2 CM
BH CV ECHO MEAS - LAT PEAK E' VEL: 8.5 CM/SEC
BH CV ECHO MEAS - LV DIASTOLIC VOL/BSA (35-75): 33.8 CM2
BH CV ECHO MEAS - LV MASS(C)D: 194 GRAMS
BH CV ECHO MEAS - LV MAX PG: 8.6 MMHG
BH CV ECHO MEAS - LV MEAN PG: 5 MMHG
BH CV ECHO MEAS - LV SYSTOLIC VOL/BSA (12-30): 10.9 CM2
BH CV ECHO MEAS - LV V1 MAX: 146.8 CM/SEC
BH CV ECHO MEAS - LV V1 VTI: 38.1 CM
BH CV ECHO MEAS - LVIDD: 5.1 CM
BH CV ECHO MEAS - LVIDS: 3.3 CM
BH CV ECHO MEAS - LVOT AREA: 2.7 CM2
BH CV ECHO MEAS - LVOT DIAM: 1.85 CM
BH CV ECHO MEAS - LVPWD: 1.01 CM
BH CV ECHO MEAS - MED PEAK E' VEL: 9.2 CM/SEC
BH CV ECHO MEAS - MV A MAX VEL: 91.5 CM/SEC
BH CV ECHO MEAS - MV DEC SLOPE: 1072 CM/SEC2
BH CV ECHO MEAS - MV DEC TIME: 0.11 SEC
BH CV ECHO MEAS - MV E MAX VEL: 120.5 CM/SEC
BH CV ECHO MEAS - MV E/A: 1.32
BH CV ECHO MEAS - RAP SYSTOLE: 3 MMHG
BH CV ECHO MEAS - RVSP: 38.9 MMHG
BH CV ECHO MEAS - SV(LVOT): 102.6 ML
BH CV ECHO MEAS - SV(MOD-SP4): 50.3 ML
BH CV ECHO MEAS - SVI(LVOT): 46.8 ML/M2
BH CV ECHO MEAS - SVI(MOD-SP4): 23 ML/M2
BH CV ECHO MEAS - TR MAX PG: 35.9 MMHG
BH CV ECHO MEAS - TR MAX VEL: 299.4 CM/SEC
BH CV ECHO MEASUREMENTS AVERAGE E/E' RATIO: 13.62
LEFT ATRIUM VOLUME INDEX: 29 ML/M2
LEFT ATRIUM VOLUME: 64 ML

## 2025-04-22 ENCOUNTER — OFFICE VISIT (OUTPATIENT)
Dept: CARDIOLOGY | Facility: CLINIC | Age: 75
End: 2025-04-22
Payer: MEDICARE

## 2025-04-22 VITALS
HEIGHT: 62 IN | BODY MASS INDEX: 48.1 KG/M2 | DIASTOLIC BLOOD PRESSURE: 88 MMHG | HEART RATE: 80 BPM | OXYGEN SATURATION: 95 % | SYSTOLIC BLOOD PRESSURE: 134 MMHG | WEIGHT: 261.4 LBS

## 2025-04-22 DIAGNOSIS — N18.32 STAGE 3B CHRONIC KIDNEY DISEASE: ICD-10-CM

## 2025-04-22 DIAGNOSIS — I50.32 CHRONIC HEART FAILURE WITH PRESERVED EJECTION FRACTION (HFPEF): ICD-10-CM

## 2025-04-22 DIAGNOSIS — E66.01 MORBID OBESITY WITH BMI OF 45.0-49.9, ADULT: ICD-10-CM

## 2025-04-22 DIAGNOSIS — G47.33 OBSTRUCTIVE SLEEP APNEA: ICD-10-CM

## 2025-04-22 DIAGNOSIS — I35.0 AORTIC STENOSIS, MODERATE: Primary | ICD-10-CM

## 2025-04-22 PROCEDURE — 1160F RVW MEDS BY RX/DR IN RCRD: CPT | Performed by: NURSE PRACTITIONER

## 2025-04-22 PROCEDURE — 99214 OFFICE O/P EST MOD 30 MIN: CPT | Performed by: NURSE PRACTITIONER

## 2025-04-22 PROCEDURE — 93000 ELECTROCARDIOGRAM COMPLETE: CPT | Performed by: NURSE PRACTITIONER

## 2025-04-22 PROCEDURE — 1159F MED LIST DOCD IN RCRD: CPT | Performed by: NURSE PRACTITIONER

## 2025-04-22 NOTE — PROGRESS NOTES
Subjective:     Encounter Date: 4/22/2025      Patient ID: Yoana Curry is a 74 y.o. female.    Chief Complaint: Follow-up valvular heart disease    History of Present Illness    74-year-old female returns today for follow-up.    She was referred to Dr. Bansal in 2023.  She follows for mild to moderate aortic valve stenosis and also initially had an RVSP on echocardiogram consistent with severe pulmonary hypertension.  She also has chronic iron deficiency anemia, stage IIIb CKD, morbid obesity, and sleep apnea.    At her initial visit Dr. Bansal referred her to bariatric surgery and added Farxiga to her medical therapy for her diabetes, CKD and to help with her dyspnea and fluid retention.    Unfortunately, she was unable to take Farxiga due to cost.    When she saw Dr. Bansal last year she had never seen bariatric surgery and reported she was not interested in going.  She was still taking Lasix 40 mg, sometimes 80 mg daily for her dyspnea and swelling.    The patient recently had another echocardiogram to continue to monitor her aortic stenosis with stable findings.  See below.  Today she reports that she feels about the same compared to last year.  She has chronic fatigue and chronic exertional dyspnea as well as leg swelling.  She states she has lost a little bit of weight.  She continues to decline bariatric surgery referral.  She reports good blood pressure control.  She reports compliance with her CPAP.  She denies chest pain, orthopnea, PND.  She has occasional brief fluttering in her chest that lasts a few seconds and does not cause other symptoms.  She is ambulating with a cane today.  She admits she is not very active.  She takes Lasix 40 mg daily and rarely ever has to take an additional 40 mg.    The following portions of the patient's history were reviewed and updated as appropriate: allergies, current medications, past family history, past medical history, past social history, past surgical  history, and problem list.    Review of Systems   Constitutional: Positive for malaise/fatigue.   Cardiovascular:  Positive for dyspnea on exertion, leg swelling and palpitations. Negative for chest pain, claudication, near-syncope, orthopnea, paroxysmal nocturnal dyspnea and syncope.   Respiratory:  Positive for shortness of breath. Negative for cough.    Hematologic/Lymphatic: Does not bruise/bleed easily.   Musculoskeletal:  Negative for falls.   Gastrointestinal:  Negative for bloating.   Neurological:  Negative for dizziness, light-headedness and weakness.         Current Outpatient Medications:     allopurinol (ZYLOPRIM) 300 MG tablet, Take 1 tablet by mouth Daily., Disp: , Rfl: 5    clopidogrel (PLAVIX) 75 MG tablet, Take 1 tablet by mouth Daily., Disp: , Rfl:     docusate sodium (COLACE) 100 MG capsule, Take 1 capsule by mouth 2 (Two) Times a Day., Disp: , Rfl:     DULoxetine (CYMBALTA) 60 MG capsule, 1 CAPSULE BY MOUTH DAILY TAKE ALONG WITH 30MG DAILY FOR A TOTAL OF 90 MG DAILY, Disp: , Rfl: 3    ezetimibe (ZETIA) 10 MG tablet, TAKE 1 TABLET BY MOUTH EVERY DAY, Disp: , Rfl: 3    famotidine (PEPCID) 20 MG tablet, Take 1 tablet by mouth 2 (Two) Times a Day., Disp: , Rfl:     ferrous sulfate 325 (65 FE) MG tablet, Take 1 tablet by mouth Every 12 (Twelve) Hours., Disp: , Rfl:     furosemide (LASIX) 40 MG tablet, 1 tablet. Takes 40mg daily with another 40mg tablet as needed, Disp: , Rfl: 3    glimepiride (AMARYL) 4 MG tablet, Take 1 tablet by mouth Every Morning Before Breakfast., Disp: , Rfl:     hydrALAZINE (APRESOLINE) 25 MG tablet, Take 1 tablet by mouth 2 (Two) Times a Day., Disp: , Rfl: 5    ketoconazole (NIZORAL) 2 % cream, PLEASE SEE ATTACHED FOR DETAILED DIRECTIONS, Disp: , Rfl:     levothyroxine (SYNTHROID, LEVOTHROID) 125 MCG tablet, TAKE 1 TABLET DAILY FOR THYROID., Disp: , Rfl: 2    lisinopril (PRINIVIL,ZESTRIL) 20 MG tablet, Take 1 tablet by mouth Daily., Disp: , Rfl:     metFORMIN (GLUCOPHAGE) 500  MG tablet, Take 1 tablet by mouth 4 (Four) Times a Day., Disp: , Rfl: 5    potassium chloride (MICRO-K) 10 MEQ CR capsule, TAKE 1 CAPSULE BY MOUTH EVERY DAY, Disp: , Rfl: 3    propranolol (INDERAL) 20 MG tablet, 1 tablet., Disp: , Rfl: 3    Sennosides-Docusate Sodium (SENNA PLUS PO), Take  by mouth., Disp: , Rfl:     triamcinolone (KENALOG) 0.1 % cream, PLEASE SEE ATTACHED FOR DETAILED DIRECTIONS, Disp: , Rfl:     verapamil SR (CALAN-SR) 240 MG CR tablet, Take 1 tablet by mouth Every Night., Disp: , Rfl:     vitamin E 400 UNIT capsule, Take  by mouth., Disp: , Rfl:        Objective:      Vitals:    04/22/25 1311   BP: 134/88   Pulse: 80   SpO2: 95%   Weight - 261 lbs    Vitals and nursing note reviewed.   Constitutional:       General: Not in acute distress.     Appearance: Well-developed and not in distress. Not diaphoretic.   Neck:      Vascular: No JVD or JVR. JVD normal.   Pulmonary:      Effort: Pulmonary effort is normal. No respiratory distress.      Breath sounds: Normal breath sounds.   Cardiovascular:      Normal rate. Regular rhythm.      Murmurs: There is a grade 2/6 harsh midsystolic murmur at the URSB.   Edema:     Peripheral edema absent.   Abdominal:      Tenderness: There is no abdominal tenderness.   Skin:     General: Skin is warm and dry.   Neurological:      Mental Status: Alert, oriented to person, place, and time and oriented to person, place and time.         Lab Review: labs reviewed - see lab tab, A1c 7.7, , creatinine 1.86, GFR 28        ECG 12 Lead    Date/Time: 4/22/2025 1:52 PM  Performed by: Maryellen Moon APRN    Authorized by: Maryellen Moon APRN  Comparison: compared with previous ECG from 4/11/2024  Similar to previous ECG  Rhythm: sinus rhythm  BPM: 80    Clinical impression: non-specific ECG          Results for orders placed during the hospital encounter of 04/02/25    Adult Transthoracic Echo Complete w/ Color, Spectral and Contrast if necessary per  protocol    Interpretation Summary    Left ventricular systolic function is normal. Left ventricular ejection fraction appears to be 66 - 70%.    Mild to moderate aortic valve stenosis is present (V-max 2.6 m/s, mean gradient 15 mmHg)    Estimated right ventricular systolic pressure from tricuspid regurgitation is mildly elevated (35-45 mmHg).    Normal size and function of the right ventricle.    No other significant (worse than mild) valvular pathology.    Compared to prior exam from 3/10/2023, no significant change.      Assessment/Plan:     Problem List Items Addressed This Visit (all established, stable)         Cardiac and Vasculature    Aortic stenosis, moderate - Primary    Relevant Medications    verapamil SR (CALAN-SR) 240 MG CR tablet                Chronic heart failure with preserved ejection fraction (HFpEF)    Relevant Medications    verapamil SR (CALAN-SR) 240 MG CR tablet       Endocrine and Metabolic    Morbid obesity with BMI of 45.0-49.9, adult    Overview     With sleep apnea, htn, DM            Genitourinary and Reproductive     Stage 3b chronic kidney disease               Sleep    Obstructive sleep apnea     Recommendations/plans:    The patient is currently stable from a cardiovascular standpoint.  Her aortic valve stenosis remains mild to moderate.  She will need another echocardiogram in April 2027, sooner with changes in exam findings or symptoms.    She continues to decline bariatric surgery referral.    I did encourage continued compliance with CPAP.    She reports well-controlled blood pressure.  She will continue her current antihypertensives as listed above.    She will continue daily Lasix 40 mg for volume management, with an additional 40 mg if needed for worsening dyspnea/orthopnea/PND or weight gain of greater than 2 pounds overnight or greater than 4 pounds in 2 days.  Has previously noted she is not taking SGLT2 inhibitor due to cost.    She will call back if palpitations  worsen in severity, duration or frequency and we will place her in a Holter monitor.    She will follow-up with Dr. Bansal in 1 year, but call sooner with symptoms or concerns.    VONNIE Villa

## (undated) DEVICE — SUTURE MCRYL SZ 4-0 L18IN ABSRB UD L19MM PS-2 3/8 CIR PRIM Y496G

## (undated) DEVICE — BLADE MAC GRN LT DISPOSABLE

## (undated) DEVICE — COVER US PRB W5XL96IN LTX W/ GEL

## (undated) DEVICE — ROYAL SILK SURGICAL GOWN, XXL: Brand: CONVERTORS

## (undated) DEVICE — NON-STERILE (3.5 X 20CM) LATEX COVER: Brand: TRANSDUCER COVER

## (undated) DEVICE — SOLUTION IV 500ML 0.9% SOD CHL PH 5 INJ USP VIAFLX PLAS

## (undated) DEVICE — Z CONVERTED USE 2715898 SWABSTICK MEDICATED W1.75XL6.5IN 1.6ML 3.15% CHG 70% ISO

## (undated) DEVICE — TOWEL,OR,DSP,ST,BLUE,DLX,4/PK,20PK/CS: Brand: MEDLINE

## (undated) DEVICE — SOLUTION IV IRRIG WATER 1000ML POUR BRL 2F7114

## (undated) DEVICE — SUTURE PERMAHAND SZ 3-0 L30IN NONABSORBABLE BLK SILK BRAID A304H

## (undated) DEVICE — SPLINT ARMBRD W3XL10.5IN POLYFOAM DLX A LN

## (undated) DEVICE — SYRINGE MED 5ML STD CLR PLAS LUERLOCK TIP N CTRL DISP

## (undated) DEVICE — NEEDLE HYPO 18GA L1.5IN PNK POLYPR HUB S STL REG BVL STR

## (undated) DEVICE — Z INACTIVE USE 2535480 CLIP LIG M BLU TI HRT SHP WIRE HORZ 180 PER BX

## (undated) DEVICE — CHLORAPREP 26ML ORANGE

## (undated) DEVICE — SHEET,T,THYROID,STERILE: Brand: MEDLINE

## (undated) DEVICE — SOLUTION IV 100ML 0.9% SOD CHL PLAS CONT USP VIAFLX 1 PER

## (undated) DEVICE — PAD,ARMBOARD,CONV,FOAM,2X8X20",12PR/CS: Brand: MEDLINE

## (undated) DEVICE — BLANKET WRM W40.2XL55.9IN IORT LO BODY + MISTRAL AIR

## (undated) DEVICE — DRAPE: INST MAG 16X20 20/CS: Brand: MEDICAL ACTION INDUSTRIES

## (undated) DEVICE — STERILE POLYISOPRENE POWDER-FREE SURGICAL GLOVES: Brand: PROTEXIS

## (undated) DEVICE — TUBE ET 7MM NSL ORAL BASIC CUF INTMED MURPHY EYE RADPQ MRK

## (undated) DEVICE — SUTURE PROL SZ 6-0 L30IN NONABSORBABLE BLU L9.3MM BV-1 3/8 M8709

## (undated) DEVICE — 3M™ STERI-DRAPE™ INSTRUMENT POUCH 1018: Brand: STERI-DRAPE™

## (undated) DEVICE — PATIENT RETURN ELECTRODE, SINGLE-USE, CONTACT QUALITY MONITORING, ADULT, WITH 9FT CORD, FOR PATIENTS WEIGING OVER 33LBS. (15KG): Brand: MEGADYNE

## (undated) DEVICE — SURGICAL PROCEDURE PACK VASC LOURDES HOSP

## (undated) DEVICE — GLOVE SURG SZ 6 L12IN FNGR THK79MIL GRN LTX FREE

## (undated) DEVICE — SYRINGE, LUER LOCK, 60ML: Brand: MEDLINE

## (undated) DEVICE — SUTURE PERMAHAND SZ 3-0 L18IN NONABSORBABLE BLK L26MM SH C013D

## (undated) DEVICE — DRESSING FOAM 0.75IN 1.5MM H DISK CHG IMPREG AEGIS

## (undated) DEVICE — BAG PRSS INFUS 500ML 2 PRSS SFTY VLV RIG HNGR SLIP EASILY

## (undated) DEVICE — CLIP INT SM WIDE RED TI TRNSVRS GRV CHEVRON SHP W/ PRECIS

## (undated) DEVICE — SUTURE VCRL SZ 3-0 L27IN ABSRB UD L26MM SH 1/2 CIR J416H

## (undated) DEVICE — GLOVE SURG SZ 7 CRM LTX FREE POLYISOPRENE POLYMER BEAD ANTI

## (undated) DEVICE — AGENT HEMSTAT W4XL4IN OXIDIZED REGENERATED CELOS ABSRB SFT

## (undated) DEVICE — HYPODERMIC SAFETY NEEDLE: Brand: MAGELLAN

## (undated) DEVICE — SUTURE PERMAHAND SZ 4-0 L12X30IN NONABSORBABLE BLK SILK A303H

## (undated) DEVICE — 3M™ IOBAN™ 2 ANTIMICROBIAL INCISE DRAPE 6650EZ: Brand: IOBAN™ 2